# Patient Record
Sex: FEMALE | Race: WHITE | NOT HISPANIC OR LATINO | URBAN - METROPOLITAN AREA
[De-identification: names, ages, dates, MRNs, and addresses within clinical notes are randomized per-mention and may not be internally consistent; named-entity substitution may affect disease eponyms.]

---

## 2023-09-07 ENCOUNTER — LAB (OUTPATIENT)
Dept: LAB | Facility: LAB | Age: 28
End: 2023-09-07
Payer: COMMERCIAL

## 2023-09-07 LAB — TOBACCO SCREEN, URINE: NEGATIVE

## 2023-09-08 DIAGNOSIS — R79.89 ABNORMAL TSH: Primary | ICD-10-CM

## 2023-09-08 LAB
ALANINE AMINOTRANSFERASE (SGPT) (U/L) IN SER/PLAS: 13 U/L (ref 7–45)
ALBUMIN (G/DL) IN SER/PLAS: 4.4 G/DL (ref 3.4–5)
ALKALINE PHOSPHATASE (U/L) IN SER/PLAS: 46 U/L (ref 33–110)
ANION GAP IN SER/PLAS: 11 MMOL/L (ref 10–20)
ASPARTATE AMINOTRANSFERASE (SGOT) (U/L) IN SER/PLAS: 16 U/L (ref 9–39)
BASOPHILS (10*3/UL) IN BLOOD BY AUTOMATED COUNT: 0.06 X10E9/L (ref 0–0.1)
BASOPHILS/100 LEUKOCYTES IN BLOOD BY AUTOMATED COUNT: 1.1 % (ref 0–2)
BILIRUBIN TOTAL (MG/DL) IN SER/PLAS: 0.7 MG/DL (ref 0–1.2)
CALCIUM (MG/DL) IN SER/PLAS: 9.6 MG/DL (ref 8.6–10.3)
CARBON DIOXIDE, TOTAL (MMOL/L) IN SER/PLAS: 28 MMOL/L (ref 21–32)
CHLORIDE (MMOL/L) IN SER/PLAS: 103 MMOL/L (ref 98–107)
CREATININE (MG/DL) IN SER/PLAS: 0.62 MG/DL (ref 0.5–1.05)
EOSINOPHILS (10*3/UL) IN BLOOD BY AUTOMATED COUNT: 0.17 X10E9/L (ref 0–0.7)
EOSINOPHILS/100 LEUKOCYTES IN BLOOD BY AUTOMATED COUNT: 3.2 % (ref 0–6)
ERYTHROCYTE DISTRIBUTION WIDTH (RATIO) BY AUTOMATED COUNT: 12 % (ref 11.5–14.5)
ERYTHROCYTE MEAN CORPUSCULAR HEMOGLOBIN CONCENTRATION (G/DL) BY AUTOMATED: 33.7 G/DL (ref 32–36)
ERYTHROCYTE MEAN CORPUSCULAR VOLUME (FL) BY AUTOMATED COUNT: 94 FL (ref 80–100)
ERYTHROCYTES (10*6/UL) IN BLOOD BY AUTOMATED COUNT: 4.59 X10E12/L (ref 4–5.2)
GFR FEMALE: >90 ML/MIN/1.73M2
GLUCOSE (MG/DL) IN SER/PLAS: 91 MG/DL (ref 74–99)
HEMATOCRIT (%) IN BLOOD BY AUTOMATED COUNT: 43 % (ref 36–46)
HEMOGLOBIN (G/DL) IN BLOOD: 14.5 G/DL (ref 12–16)
IMMATURE GRANULOCYTES/100 LEUKOCYTES IN BLOOD BY AUTOMATED COUNT: 0.2 % (ref 0–0.9)
LEUKOCYTES (10*3/UL) IN BLOOD BY AUTOMATED COUNT: 5.2 X10E9/L (ref 4.4–11.3)
LYMPHOCYTES (10*3/UL) IN BLOOD BY AUTOMATED COUNT: 2.45 X10E9/L (ref 1.2–4.8)
LYMPHOCYTES/100 LEUKOCYTES IN BLOOD BY AUTOMATED COUNT: 46.8 % (ref 13–44)
MONOCYTES (10*3/UL) IN BLOOD BY AUTOMATED COUNT: 0.32 X10E9/L (ref 0.1–1)
MONOCYTES/100 LEUKOCYTES IN BLOOD BY AUTOMATED COUNT: 6.1 % (ref 2–10)
NEUTROPHILS (10*3/UL) IN BLOOD BY AUTOMATED COUNT: 2.23 X10E9/L (ref 1.2–7.7)
NEUTROPHILS/100 LEUKOCYTES IN BLOOD BY AUTOMATED COUNT: 42.6 % (ref 40–80)
NRBC (PER 100 WBCS) BY AUTOMATED COUNT: 0 /100 WBC (ref 0–0)
PLATELETS (10*3/UL) IN BLOOD AUTOMATED COUNT: 277 X10E9/L (ref 150–450)
POTASSIUM (MMOL/L) IN SER/PLAS: 3.8 MMOL/L (ref 3.5–5.3)
PROTEIN TOTAL: 7.3 G/DL (ref 6.4–8.2)
SODIUM (MMOL/L) IN SER/PLAS: 138 MMOL/L (ref 136–145)
THYROTROPIN (MIU/L) IN SER/PLAS BY DETECTION LIMIT <= 0.05 MIU/L: 4.68 MIU/L (ref 0.44–3.98)
THYROXINE (T4) FREE (NG/DL) IN SER/PLAS: 0.83 NG/DL (ref 0.61–1.12)
UREA NITROGEN (MG/DL) IN SER/PLAS: 13 MG/DL (ref 6–23)

## 2023-09-11 ENCOUNTER — LAB (OUTPATIENT)
Dept: LAB | Facility: LAB | Age: 28
End: 2023-09-11
Payer: COMMERCIAL

## 2023-09-11 DIAGNOSIS — R79.89 ABNORMAL TSH: ICD-10-CM

## 2023-09-11 LAB — THYROPEROXIDASE AB (IU/ML) IN SER/PLAS: 35 IU/ML

## 2023-09-11 PROCEDURE — 86800 THYROGLOBULIN ANTIBODY: CPT

## 2023-09-11 PROCEDURE — 86376 MICROSOMAL ANTIBODY EACH: CPT

## 2023-09-11 PROCEDURE — 36415 COLL VENOUS BLD VENIPUNCTURE: CPT

## 2023-09-14 LAB — THYROGLOBULIN AB (IU/ML) IN SER/PLAS: 17 IU/ML (ref 0–4)

## 2023-10-20 ENCOUNTER — APPOINTMENT (OUTPATIENT)
Dept: OBSTETRICS AND GYNECOLOGY | Facility: CLINIC | Age: 28
End: 2023-10-20
Payer: COMMERCIAL

## 2023-10-30 DIAGNOSIS — E03.8 OTHER SPECIFIED HYPOTHYROIDISM: Primary | ICD-10-CM

## 2023-10-31 ENCOUNTER — LAB (OUTPATIENT)
Dept: LAB | Facility: LAB | Age: 28
End: 2023-10-31
Payer: COMMERCIAL

## 2023-10-31 DIAGNOSIS — E03.8 OTHER SPECIFIED HYPOTHYROIDISM: ICD-10-CM

## 2023-10-31 LAB
T4 FREE SERPL-MCNC: 0.71 NG/DL (ref 0.61–1.12)
TSH SERPL-ACNC: 1.62 MIU/L (ref 0.44–3.98)

## 2023-10-31 PROCEDURE — 36415 COLL VENOUS BLD VENIPUNCTURE: CPT

## 2023-10-31 PROCEDURE — 84443 ASSAY THYROID STIM HORMONE: CPT

## 2023-10-31 PROCEDURE — 84439 ASSAY OF FREE THYROXINE: CPT

## 2023-11-06 ENCOUNTER — APPOINTMENT (OUTPATIENT)
Dept: RADIOLOGY | Facility: HOSPITAL | Age: 28
End: 2023-11-06
Payer: COMMERCIAL

## 2023-11-06 ENCOUNTER — TELEPHONE (OUTPATIENT)
Dept: OBSTETRICS AND GYNECOLOGY | Facility: CLINIC | Age: 28
End: 2023-11-06

## 2023-11-06 ENCOUNTER — HOSPITAL ENCOUNTER (EMERGENCY)
Facility: HOSPITAL | Age: 28
Discharge: HOME | End: 2023-11-06
Attending: EMERGENCY MEDICINE
Payer: COMMERCIAL

## 2023-11-06 VITALS
TEMPERATURE: 98.9 F | HEIGHT: 64 IN | BODY MASS INDEX: 22.53 KG/M2 | OXYGEN SATURATION: 99 % | DIASTOLIC BLOOD PRESSURE: 68 MMHG | WEIGHT: 132 LBS | SYSTOLIC BLOOD PRESSURE: 111 MMHG | RESPIRATION RATE: 18 BRPM | HEART RATE: 73 BPM

## 2023-11-06 DIAGNOSIS — O20.0 THREATENED MISCARRIAGE IN EARLY PREGNANCY (HHS-HCC): Primary | ICD-10-CM

## 2023-11-06 LAB
ABO GROUP (TYPE) IN BLOOD: NORMAL
ALBUMIN SERPL BCP-MCNC: 4.3 G/DL (ref 3.4–5)
ALP SERPL-CCNC: 40 U/L (ref 33–110)
ALT SERPL W P-5'-P-CCNC: 12 U/L (ref 7–45)
ANION GAP SERPL CALC-SCNC: 13 MMOL/L (ref 10–20)
ANTIBODY SCREEN: NORMAL
APPEARANCE UR: CLEAR
AST SERPL W P-5'-P-CCNC: 14 U/L (ref 9–39)
B-HCG SERPL-ACNC: 992 MIU/ML
BACTERIA #/AREA URNS AUTO: ABNORMAL /HPF
BASOPHILS # BLD AUTO: 0.05 X10*3/UL (ref 0–0.1)
BASOPHILS NFR BLD AUTO: 0.9 %
BILIRUB DIRECT SERPL-MCNC: 0 MG/DL (ref 0–0.3)
BILIRUB SERPL-MCNC: 0.3 MG/DL (ref 0–1.2)
BILIRUB UR STRIP.AUTO-MCNC: NEGATIVE MG/DL
BUN SERPL-MCNC: 10 MG/DL (ref 6–23)
CALCIUM SERPL-MCNC: 8.9 MG/DL (ref 8.6–10.3)
CHLORIDE SERPL-SCNC: 106 MMOL/L (ref 98–107)
CO2 SERPL-SCNC: 23 MMOL/L (ref 21–32)
COLOR UR: YELLOW
CREAT SERPL-MCNC: 0.57 MG/DL (ref 0.5–1.05)
EOSINOPHIL # BLD AUTO: 0.11 X10*3/UL (ref 0–0.7)
EOSINOPHIL NFR BLD AUTO: 2 %
ERYTHROCYTE [DISTWIDTH] IN BLOOD BY AUTOMATED COUNT: 12 % (ref 11.5–14.5)
GFR SERPL CREATININE-BSD FRML MDRD: >90 ML/MIN/1.73M*2
GLUCOSE SERPL-MCNC: 98 MG/DL (ref 74–99)
GLUCOSE UR STRIP.AUTO-MCNC: NEGATIVE MG/DL
HCG UR QL IA.RAPID: POSITIVE
HCT VFR BLD AUTO: 41.4 % (ref 36–46)
HGB BLD-MCNC: 14.1 G/DL (ref 12–16)
HOLD SPECIMEN: NORMAL
IMM GRANULOCYTES # BLD AUTO: 0.01 X10*3/UL (ref 0–0.7)
IMM GRANULOCYTES NFR BLD AUTO: 0.2 % (ref 0–0.9)
KETONES UR STRIP.AUTO-MCNC: NEGATIVE MG/DL
LEUKOCYTE ESTERASE UR QL STRIP.AUTO: NEGATIVE
LYMPHOCYTES # BLD AUTO: 2.12 X10*3/UL (ref 1.2–4.8)
LYMPHOCYTES NFR BLD AUTO: 38.1 %
MCH RBC QN AUTO: 31.4 PG (ref 26–34)
MCHC RBC AUTO-ENTMCNC: 34.1 G/DL (ref 32–36)
MCV RBC AUTO: 92 FL (ref 80–100)
MONOCYTES # BLD AUTO: 0.35 X10*3/UL (ref 0.1–1)
MONOCYTES NFR BLD AUTO: 6.3 %
MUCOUS THREADS #/AREA URNS AUTO: ABNORMAL /LPF
NEUTROPHILS # BLD AUTO: 2.92 X10*3/UL (ref 1.2–7.7)
NEUTROPHILS NFR BLD AUTO: 52.5 %
NITRITE UR QL STRIP.AUTO: NEGATIVE
NRBC BLD-RTO: 0 /100 WBCS (ref 0–0)
PH UR STRIP.AUTO: 6 [PH]
PLATELET # BLD AUTO: 247 X10*3/UL (ref 150–450)
POTASSIUM SERPL-SCNC: 3.8 MMOL/L (ref 3.5–5.3)
PROT SERPL-MCNC: 7 G/DL (ref 6.4–8.2)
PROT UR STRIP.AUTO-MCNC: NEGATIVE MG/DL
RBC # BLD AUTO: 4.49 X10*6/UL (ref 4–5.2)
RBC # UR STRIP.AUTO: ABNORMAL /UL
RBC #/AREA URNS AUTO: ABNORMAL /HPF
RH FACTOR (ANTIGEN D): NORMAL
SODIUM SERPL-SCNC: 138 MMOL/L (ref 136–145)
SP GR UR STRIP.AUTO: 1.01
UROBILINOGEN UR STRIP.AUTO-MCNC: <2 MG/DL
WBC # BLD AUTO: 5.6 X10*3/UL (ref 4.4–11.3)
WBC #/AREA URNS AUTO: ABNORMAL /HPF

## 2023-11-06 PROCEDURE — 76817 TRANSVAGINAL US OBSTETRIC: CPT

## 2023-11-06 PROCEDURE — 80053 COMPREHEN METABOLIC PANEL: CPT | Performed by: EMERGENCY MEDICINE

## 2023-11-06 PROCEDURE — 85025 COMPLETE CBC W/AUTO DIFF WBC: CPT | Performed by: EMERGENCY MEDICINE

## 2023-11-06 PROCEDURE — 99284 EMERGENCY DEPT VISIT MOD MDM: CPT | Mod: 25

## 2023-11-06 PROCEDURE — 36415 COLL VENOUS BLD VENIPUNCTURE: CPT | Performed by: EMERGENCY MEDICINE

## 2023-11-06 PROCEDURE — 94760 N-INVAS EAR/PLS OXIMETRY 1: CPT

## 2023-11-06 PROCEDURE — 99285 EMERGENCY DEPT VISIT HI MDM: CPT | Mod: 25 | Performed by: EMERGENCY MEDICINE

## 2023-11-06 PROCEDURE — 76815 OB US LIMITED FETUS(S): CPT | Performed by: RADIOLOGY

## 2023-11-06 PROCEDURE — 81025 URINE PREGNANCY TEST: CPT | Performed by: EMERGENCY MEDICINE

## 2023-11-06 PROCEDURE — 81001 URINALYSIS AUTO W/SCOPE: CPT | Performed by: EMERGENCY MEDICINE

## 2023-11-06 PROCEDURE — 84702 CHORIONIC GONADOTROPIN TEST: CPT | Performed by: EMERGENCY MEDICINE

## 2023-11-06 PROCEDURE — 86900 BLOOD TYPING SEROLOGIC ABO: CPT | Performed by: EMERGENCY MEDICINE

## 2023-11-06 PROCEDURE — 82248 BILIRUBIN DIRECT: CPT | Performed by: EMERGENCY MEDICINE

## 2023-11-06 ASSESSMENT — PAIN DESCRIPTION - LOCATION: LOCATION: ABDOMEN

## 2023-11-06 ASSESSMENT — PAIN - FUNCTIONAL ASSESSMENT: PAIN_FUNCTIONAL_ASSESSMENT: 0-10

## 2023-11-06 ASSESSMENT — COLUMBIA-SUICIDE SEVERITY RATING SCALE - C-SSRS
6. HAVE YOU EVER DONE ANYTHING, STARTED TO DO ANYTHING, OR PREPARED TO DO ANYTHING TO END YOUR LIFE?: NO
2. HAVE YOU ACTUALLY HAD ANY THOUGHTS OF KILLING YOURSELF?: NO
1. IN THE PAST MONTH, HAVE YOU WISHED YOU WERE DEAD OR WISHED YOU COULD GO TO SLEEP AND NOT WAKE UP?: NO

## 2023-11-06 ASSESSMENT — PAIN DESCRIPTION - DESCRIPTORS: DESCRIPTORS: TENDER

## 2023-11-06 ASSESSMENT — PAIN SCALES - GENERAL: PAINLEVEL_OUTOF10: 1

## 2023-11-06 ASSESSMENT — PAIN DESCRIPTION - ORIENTATION: ORIENTATION: RIGHT;LOWER

## 2023-11-06 NOTE — TELEPHONE ENCOUNTER
NOB called OB line to schedule her initial OB appointment.  She is currently in the ER at [facility] for spotting for the last 2 days.  She noticed this morning that her breast tenderness is gone and is concerned that she may be having a MAB.  As I was speaking with the patient she was called back for an ultrasound, patient gave verbal permission for me to speak with her  to schedule her NOB.  LMP 9/20/2023, he believes.  She started having pink/red spotting on Saturday, then it turned brown and she did pass a few small clots.  This morning she told him that her breasts were no longer tender so they decided to get her evaluated in the ER to make sure everything with the pregnancy is ok.  I assured patients  that this is the best place to be for this evaluation.  LMP puts patient about 6 weeks 5 days, which is still very early.  They may not be able to see fetal structures on ultrasound due to being too early but its a good place to start.  He did mention that she had labs drawn so far as well. He was advised that its typically recommended to have follow up labs done in about 48 hours, depending on original results.  The providers typically request that patients have their NOB between 8-10 weeks.  For now, NOB scheduled with Dr More on 11/15.  Patients  and/or patient will call office back after they get discharged to see if this appointment needs to be changed and with an update on how she is doing.  Message forwarded to Dr More to make her aware.

## 2023-11-06 NOTE — ED PROVIDER NOTES
HPI   Chief Complaint   Patient presents with    Vaginal Bleeding     Pt arrived to ed from home. Pt states she is 6 weeks pregnant pt states she started having some vaginal bleeding Saturday morning pt states the blood was a brownish color and the spotting lasted all day. Pt states  the bleeding continued and was enough to wear a pad. Pt states  the blood was a brighter red. Pt this morning she is still spotting with lower right abodemen tenderness. Pt describes the pain as being tender. Pt states last menstrual cycle        HPI: []  28-year-old white female  1 para 0 about 6 weeks pregnant has not had any prenatal care so far comes in with vaginal bleeding off and on for last few days.  She stated for the last few days she has had intermittent vaginal bleeding which varies from bright red blood to draw blood.  No abdominal pain or cramping.  No dizziness no weakness no syncope or near syncope she denies hematemesis melena hematochezia.  Patient denies any chest pain pressure heaviness fever chills cough congestion incontinence seizures syncope or near syncope.  Denies urine frequency urgency or hematuria.    Past history: None  Social: Patient denies current tobacco alcohol drug abuse.  REVIEW OF SYSTEMS:    GENERAL.: No weight loss, fatigue, anorexia, insomnia, fever.    EYES: No vision loss, double vision, drainage, eye pain.    ENT: No pharyngitis, dry mouth.    CARDIOPULMONARY: No chest pain, palpitations, syncope, near syncope. No shortness of breath, cough, hemoptysis.    GI: No abdominal pain, change in bowel habits, melena, hematemesis, hematochezia, nausea, vomiting, diarrhea.    : No discharge, dysuria, frequency, urgency, hematuria.  Positive vaginal bleeding    MS: No limb pain, joint pain, joint swelling.    SKIN: No rashes.    PSYCH: No depression, anxiety, suicidality, homicidality.    Review of systems is otherwise negative unless stated above or in history of  present illness.  Social history, family history, allergies reviewed.  PHYSICAL EXAM:    GENERAL: Vitals noted, no distress. Alert and oriented  x 3. Non-toxic.      EENT: TMs clear. Posterior oropharynx unremarkable. No meningismus. No LAD.     NECK: Supple. Nontender. No midline tenderness.     CARDIAC: Regular, rate, rhythm. No murmurs rubs or gallops. No JVD    PULMONARY: Lungs clear bilaterally with good aeration. No wheezes rales or rhonchi. No respiratory distress.     ABDOMEN: Soft, nonsurgical. Nontender. No peritoneal signs. Normoactive bowel sounds. No pulsatile masses.  Negative CVA tenderness    Pelvic: Female chaperone present, normal external genitalia, cervix closed, scant amount of blood in the vaginal vault, no CMT.  No active hemorrhage.    EXTREMITIES: No peripheral edema. Negative Homans bilaterally, no cords.  2+ bounding pulses well-perfused    SKIN: No rash. Intact.     NEURO: No focal neurologic deficits, NIH score of 0. Cranial nerves normal as tested from II through XII.     MEDICAL DECISION MAKING:  CBC with differential is unremarkable no leukocytosis stable hemoglobin pregnancy positive serum hCG is 992 Rh B+, pelvic exam shows a 5-week 0-day gestational sac.  Negative ectopic pregnancy.    Treatment in ED: Arrival IV established, placed on a cardiac monitor.    ED course: Patient remained stable hemodynamically with no active hemorrhage.    Impression: Threatened miscarriage    Plans and MDM: Young for 8 female primigravida comes in with the vaginal bleeding off and on for last few days.  Currently stable hemodynamic.  Stable hemoglobin.  Soft abdomen.  Cervix closed.  No active hemorrhage.  Ultrasound shows 5-week gestational sac, low suspicion for ectopic pregnancy or hemorrhagic shock, patient discharged home with supportive care advised and outpatient follow with primary doctor and OB/GYN for repeat hCG in 48 hours repeat ultrasound in within a week's time with strict return  precautions.                          No data recorded                Patient History   No past medical history on file.  No past surgical history on file.  No family history on file.  Social History     Tobacco Use    Smoking status: Not on file    Smokeless tobacco: Not on file   Substance Use Topics    Alcohol use: Not on file    Drug use: Not on file       Physical Exam   ED Triage Vitals   Temp Heart Rate Resp BP   11/06/23 0610 11/06/23 0610 11/06/23 0610 11/06/23 0610   37.2 °C (98.9 °F) 64 18 109/80      SpO2 Temp Source Heart Rate Source Patient Position   11/06/23 0610 11/06/23 0610 11/06/23 1116 11/06/23 0610   98 % Oral Monitor Sitting      BP Location FiO2 (%)     11/06/23 0610 --     Right arm        Physical Exam    ED Course & MDM   ED Course as of 11/06/23 1130   Mon Nov 06, 2023   1128 Patient remained stable in the ED hemodynamically.  With no active vaginal bleeding.  Pelvic exam performed female chaperone present, normal external genitalia, cervix is closed.  Scant blood in the vaginal vault no active hemorrhage no CMT. [MT]      ED Course User Index  [MT] Edgar Nina MD         Diagnoses as of 11/06/23 1130   Threatened miscarriage in early pregnancy       Medical Decision Making      Procedure  Procedures     Edgar Nina MD  11/06/23 1133

## 2023-11-08 ENCOUNTER — LAB (OUTPATIENT)
Dept: LAB | Facility: LAB | Age: 28
End: 2023-11-08
Payer: COMMERCIAL

## 2023-11-08 ENCOUNTER — PATIENT MESSAGE (OUTPATIENT)
Dept: PRIMARY CARE | Facility: CLINIC | Age: 28
End: 2023-11-08
Payer: COMMERCIAL

## 2023-11-08 DIAGNOSIS — Z34.90 PREGNANCY, UNSPECIFIED GESTATIONAL AGE (HHS-HCC): ICD-10-CM

## 2023-11-08 DIAGNOSIS — Z34.90 PREGNANCY, UNSPECIFIED GESTATIONAL AGE (HHS-HCC): Primary | ICD-10-CM

## 2023-11-08 DIAGNOSIS — N93.9 VAGINAL BLEEDING: ICD-10-CM

## 2023-11-08 PROBLEM — F41.9 ANXIETY: Status: ACTIVE | Noted: 2023-11-08

## 2023-11-08 PROBLEM — M51.26 LUMBAR DISC HERNIATION: Status: ACTIVE | Noted: 2023-11-08

## 2023-11-08 PROBLEM — Z97.5 IUD (INTRAUTERINE DEVICE) IN PLACE: Status: ACTIVE | Noted: 2021-02-17

## 2023-11-08 PROBLEM — M62.830 SPASM OF MUSCLE OF LOWER BACK: Status: ACTIVE | Noted: 2023-11-08

## 2023-11-08 PROBLEM — R53.83 FATIGUE: Status: ACTIVE | Noted: 2023-11-08

## 2023-11-08 PROBLEM — M79.671 PAIN OF RIGHT HEEL: Status: ACTIVE | Noted: 2023-11-08

## 2023-11-08 LAB — B-HCG SERPL-ACNC: 977 MIU/ML

## 2023-11-08 PROCEDURE — 84702 CHORIONIC GONADOTROPIN TEST: CPT

## 2023-11-08 PROCEDURE — 36415 COLL VENOUS BLD VENIPUNCTURE: CPT

## 2023-11-08 RX ORDER — QUINIDINE GLUCONATE 324 MG
TABLET, EXTENDED RELEASE ORAL
COMMUNITY
End: 2023-11-09 | Stop reason: WASHOUT

## 2023-11-08 RX ORDER — TRETINOIN 1 MG/G
1 CREAM TOPICAL NIGHTLY
COMMUNITY
Start: 2023-02-27 | End: 2023-11-09 | Stop reason: WASHOUT

## 2023-11-08 RX ORDER — CYCLOBENZAPRINE HCL 10 MG
10 TABLET ORAL NIGHTLY
COMMUNITY
Start: 2023-02-27 | End: 2023-11-09 | Stop reason: WASHOUT

## 2023-11-08 RX ORDER — SERTRALINE HYDROCHLORIDE 25 MG/1
25 TABLET, FILM COATED ORAL DAILY
COMMUNITY
End: 2023-11-09 | Stop reason: WASHOUT

## 2023-11-08 RX ORDER — DOXYCYCLINE 100 MG/1
1 CAPSULE ORAL
COMMUNITY
End: 2023-11-09 | Stop reason: WASHOUT

## 2023-11-08 RX ORDER — METRONIDAZOLE 7.5 MG/G
1 CREAM TOPICAL EVERY MORNING
COMMUNITY
End: 2023-11-09 | Stop reason: WASHOUT

## 2023-11-08 RX ORDER — GABAPENTIN 300 MG/1
1 CAPSULE ORAL 3 TIMES DAILY
COMMUNITY
Start: 2022-05-25 | End: 2023-11-09 | Stop reason: WASHOUT

## 2023-11-08 RX ORDER — NORGESTIMATE AND ETHINYL ESTRADIOL 0.25-0.035
1 KIT ORAL
COMMUNITY
Start: 2021-02-06 | End: 2023-11-09 | Stop reason: WASHOUT

## 2023-11-08 NOTE — PROGRESS NOTES
Assessment/Plan     28 y.o. female here for -     Early Pregnancy Bleeding  - discussed with patient that while suggestive of abnormal pregnancy, decreasing B-HCG levels alone are not sufficient to diagnose miscarriage  - recommended repeat ultrasound at least one week from first scan; pt already has scheduled (ordered by PCP) for Wednesday 11/15/23  - pending diagnosis of missed AB, discussed management options including expectant management, medication management, and surgical management. Risks and benefits reviewed of each. Pt expresses desire to pursue D&C.  - Follow-up visit scheduled for Wednesday 11/15/23 at 0830 with Dr. More for D&C consult.   - Discussed possibility of experiencing complete, spontaneous AB prior to follow-up visit. Reviewed bleeding precautions. Pt has emergency answering service number and is aware of when to call. Will cancel D&C if products of conception are passed spontaneously.     SYDNEY Pierson-SYDNEE     Subjective     Anne Villa is a 28 y.o.  female presenting for early pregnancy bleeding.  Pt should be 6w4d gestation today by an LMP of 23.  She presented to [facility] ED on 23 for complaints of vaginal bleeding and cramping which started on 23. Chart reviewed:    23 pelvic US visualized an intrauterine gestational sac with a CRL of 5mm corresponding to 5w0d gestation. No yolk sac or fetal pole seen.  23 B-, Blood type B+, H&H 14.1/41.4  23 B-    Pt states she is still experiencing a moderate amount of bleeding, requiring changing a pad every 3 hours. She also has passed some small blood clots. She denies the passage of any large blood clots, fever, chills, dizziness, or feeling faint.    She shares that she is going out of town on 23 and is fearful of anything happening while out of town.    Objective     /62   Wt 60.1 kg (132 lb 9.6 oz)   LMP 2023 (Approximate)   BMI 22.76 kg/m²     Physical  Exam  Vitals reviewed.   Constitutional:       General: She is not in acute distress.     Appearance: Normal appearance.   HENT:      Head: Normocephalic and atraumatic.   Pulmonary:      Effort: Pulmonary effort is normal.   Musculoskeletal:         General: Normal range of motion.      Cervical back: Normal range of motion.   Neurological:      Mental Status: She is alert and oriented to person, place, and time.   Psychiatric:         Mood and Affect: Mood normal.         Behavior: Behavior normal.         Thought Content: Thought content normal.         Judgment: Judgment normal.

## 2023-11-09 ENCOUNTER — INITIAL PRENATAL (OUTPATIENT)
Dept: OBSTETRICS AND GYNECOLOGY | Facility: CLINIC | Age: 28
End: 2023-11-09
Payer: COMMERCIAL

## 2023-11-09 VITALS — SYSTOLIC BLOOD PRESSURE: 104 MMHG | WEIGHT: 132.6 LBS | DIASTOLIC BLOOD PRESSURE: 62 MMHG | BODY MASS INDEX: 22.76 KG/M2

## 2023-11-09 DIAGNOSIS — O26.91 ABNORMAL PREGNANCY IN FIRST TRIMESTER (HHS-HCC): ICD-10-CM

## 2023-11-09 DIAGNOSIS — O20.9 BLEEDING IN EARLY PREGNANCY (HHS-HCC): Primary | ICD-10-CM

## 2023-11-09 PROCEDURE — 0500F INITIAL PRENATAL CARE VISIT: CPT

## 2023-11-14 ENCOUNTER — APPOINTMENT (OUTPATIENT)
Dept: OBSTETRICS AND GYNECOLOGY | Facility: CLINIC | Age: 28
End: 2023-11-14
Payer: COMMERCIAL

## 2023-11-15 ENCOUNTER — APPOINTMENT (OUTPATIENT)
Dept: RADIOLOGY | Facility: CLINIC | Age: 28
End: 2023-11-15
Payer: COMMERCIAL

## 2023-11-15 ENCOUNTER — ANCILLARY PROCEDURE (OUTPATIENT)
Dept: RADIOLOGY | Facility: CLINIC | Age: 28
End: 2023-11-15
Payer: COMMERCIAL

## 2023-11-15 ENCOUNTER — APPOINTMENT (OUTPATIENT)
Dept: OBSTETRICS AND GYNECOLOGY | Facility: CLINIC | Age: 28
End: 2023-11-15
Payer: COMMERCIAL

## 2023-11-15 DIAGNOSIS — N93.9 VAGINAL BLEEDING: ICD-10-CM

## 2023-11-15 DIAGNOSIS — Z34.90 PREGNANCY, UNSPECIFIED GESTATIONAL AGE (HHS-HCC): ICD-10-CM

## 2023-11-15 PROCEDURE — 76856 US EXAM PELVIC COMPLETE: CPT | Performed by: RADIOLOGY

## 2023-11-15 PROCEDURE — 76830 TRANSVAGINAL US NON-OB: CPT | Performed by: RADIOLOGY

## 2023-11-15 PROCEDURE — 76830 TRANSVAGINAL US NON-OB: CPT

## 2023-11-17 ENCOUNTER — TELEPHONE (OUTPATIENT)
Dept: OBSTETRICS AND GYNECOLOGY | Facility: CLINIC | Age: 28
End: 2023-11-17

## 2023-11-17 NOTE — TELEPHONE ENCOUNTER
Called patient, verified name and .  Reviewed ultrasound report from 11/15/23. Absence of previously seen gestational sac is consistent with completed, spontaneous miscarriage. Pt states that her bleeding has ceased and physically she is feeling well. All questions answered. Condolences expressed. She plans to start counseling to help her with grieving. She and her  do plan on trying for another pregnancy - encouraged patient to wait until after next menses to begin trying. She verbalizes understanding.    ADONAY Corrigan CNM

## 2023-11-17 NOTE — TELEPHONE ENCOUNTER
Pt called in wanting to know if Shekhar could look over her ultrasound results and give her a call.

## 2023-12-04 ENCOUNTER — APPOINTMENT (OUTPATIENT)
Dept: OBSTETRICS AND GYNECOLOGY | Facility: CLINIC | Age: 28
End: 2023-12-04
Payer: COMMERCIAL

## 2023-12-11 ENCOUNTER — PATIENT MESSAGE (OUTPATIENT)
Dept: PRIMARY CARE | Facility: CLINIC | Age: 28
End: 2023-12-11

## 2023-12-11 ENCOUNTER — APPOINTMENT (OUTPATIENT)
Dept: OBSTETRICS AND GYNECOLOGY | Facility: CLINIC | Age: 28
End: 2023-12-11
Payer: COMMERCIAL

## 2023-12-11 DIAGNOSIS — E03.8 OTHER SPECIFIED HYPOTHYROIDISM: ICD-10-CM

## 2023-12-11 DIAGNOSIS — E06.3 HASHIMOTO'S THYROIDITIS: Primary | ICD-10-CM

## 2023-12-11 RX ORDER — LEVOTHYROXINE SODIUM 25 UG/1
25 TABLET ORAL DAILY
Qty: 90 TABLET | Refills: 3 | Status: SHIPPED | OUTPATIENT
Start: 2023-12-11 | End: 2024-12-10

## 2023-12-21 ENCOUNTER — TELEPHONE (OUTPATIENT)
Dept: OBSTETRICS AND GYNECOLOGY | Facility: CLINIC | Age: 28
End: 2023-12-21
Payer: COMMERCIAL

## 2023-12-22 ENCOUNTER — TELEPHONE (OUTPATIENT)
Dept: OBSTETRICS AND GYNECOLOGY | Facility: CLINIC | Age: 28
End: 2023-12-22

## 2023-12-22 DIAGNOSIS — Z32.00 POSSIBLE PREGNANCY: Primary | ICD-10-CM

## 2023-12-22 NOTE — TELEPHONE ENCOUNTER
EP JOSE RAFAEL left message on ob line stating she just took a + UPT but is unsure how far along she is due to recently having a miscarriage at the beginning of November and not having a period since.  Patient is requesting BHCG's or ultrasound.  Please advise

## 2023-12-23 ENCOUNTER — LAB (OUTPATIENT)
Dept: LAB | Facility: LAB | Age: 28
End: 2023-12-23

## 2023-12-23 DIAGNOSIS — O36.80X0 PREGNANCY OF UNKNOWN ANATOMIC LOCATION (HHS-HCC): Primary | ICD-10-CM

## 2023-12-23 DIAGNOSIS — E06.3 HASHIMOTO'S THYROIDITIS: ICD-10-CM

## 2023-12-23 DIAGNOSIS — Z32.00 POSSIBLE PREGNANCY: ICD-10-CM

## 2023-12-23 LAB
B-HCG SERPL-ACNC: 8499 MIU/ML
TSH SERPL-ACNC: 1.42 MIU/L (ref 0.44–3.98)

## 2023-12-23 PROCEDURE — 36415 COLL VENOUS BLD VENIPUNCTURE: CPT

## 2023-12-23 PROCEDURE — 84443 ASSAY THYROID STIM HORMONE: CPT

## 2023-12-23 PROCEDURE — 84702 CHORIONIC GONADOTROPIN TEST: CPT

## 2023-12-26 ENCOUNTER — LAB (OUTPATIENT)
Dept: LAB | Facility: LAB | Age: 28
End: 2023-12-26
Payer: COMMERCIAL

## 2023-12-26 DIAGNOSIS — Z32.00 POSSIBLE PREGNANCY: ICD-10-CM

## 2023-12-26 LAB — B-HCG SERPL-ACNC: ABNORMAL MIU/ML

## 2023-12-26 PROCEDURE — 84702 CHORIONIC GONADOTROPIN TEST: CPT

## 2023-12-26 PROCEDURE — 36415 COLL VENOUS BLD VENIPUNCTURE: CPT

## 2023-12-29 ENCOUNTER — LAB (OUTPATIENT)
Dept: LAB | Facility: LAB | Age: 28
End: 2023-12-29
Payer: COMMERCIAL

## 2023-12-29 DIAGNOSIS — Z32.00 POSSIBLE PREGNANCY: ICD-10-CM

## 2023-12-29 LAB — B-HCG SERPL-ACNC: ABNORMAL MIU/ML

## 2023-12-29 PROCEDURE — 84702 CHORIONIC GONADOTROPIN TEST: CPT

## 2023-12-29 PROCEDURE — 36415 COLL VENOUS BLD VENIPUNCTURE: CPT

## 2024-01-02 ENCOUNTER — TELEPHONE (OUTPATIENT)
Dept: OBSTETRICS AND GYNECOLOGY | Facility: CLINIC | Age: 29
End: 2024-01-02
Payer: COMMERCIAL

## 2024-01-02 NOTE — TELEPHONE ENCOUNTER
Called patient, verified name and .     Pt states her right-sided abdominal pain has improved since she called on 23. It is now much more intermittent and mild. She notices improvement with Tylenol. It does not prevent her from performing ADL's, working, etc.   She currently has an ultrasound appointment scheduled for 24. Offered to move this appointment up earlier, however she declines at this time due to her work schedule. Reviewed s/s of ectopic pregnancy which would warrant ED evaluation. Pt verbalizes understanding.     ARELIS Pierson

## 2024-01-08 ENCOUNTER — APPOINTMENT (OUTPATIENT)
Dept: OBSTETRICS AND GYNECOLOGY | Facility: CLINIC | Age: 29
End: 2024-01-08
Payer: COMMERCIAL

## 2024-01-08 ENCOUNTER — HOSPITAL ENCOUNTER (OUTPATIENT)
Dept: RADIOLOGY | Facility: HOSPITAL | Age: 29
Discharge: HOME | End: 2024-01-08
Payer: COMMERCIAL

## 2024-01-08 DIAGNOSIS — O36.80X0 PREGNANCY OF UNKNOWN ANATOMIC LOCATION (HHS-HCC): ICD-10-CM

## 2024-01-08 PROCEDURE — 76801 OB US < 14 WKS SINGLE FETUS: CPT | Performed by: OBSTETRICS & GYNECOLOGY

## 2024-01-08 PROCEDURE — 76801 OB US < 14 WKS SINGLE FETUS: CPT

## 2024-01-08 PROCEDURE — 76817 TRANSVAGINAL US OBSTETRIC: CPT | Performed by: OBSTETRICS & GYNECOLOGY

## 2024-01-10 ENCOUNTER — APPOINTMENT (OUTPATIENT)
Dept: OBSTETRICS AND GYNECOLOGY | Facility: CLINIC | Age: 29
End: 2024-01-10
Payer: COMMERCIAL

## 2024-01-17 ENCOUNTER — INITIAL PRENATAL (OUTPATIENT)
Dept: OBSTETRICS AND GYNECOLOGY | Facility: CLINIC | Age: 29
End: 2024-01-17
Payer: COMMERCIAL

## 2024-01-17 VITALS — SYSTOLIC BLOOD PRESSURE: 120 MMHG | WEIGHT: 139 LBS | DIASTOLIC BLOOD PRESSURE: 58 MMHG | BODY MASS INDEX: 23.86 KG/M2

## 2024-01-17 DIAGNOSIS — O21.9 NAUSEA AND VOMITING IN PREGNANCY (HHS-HCC): ICD-10-CM

## 2024-01-17 DIAGNOSIS — Z3A.09 9 WEEKS GESTATION OF PREGNANCY (HHS-HCC): Primary | ICD-10-CM

## 2024-01-17 DIAGNOSIS — E06.3 HASHIMOTO'S DISEASE: ICD-10-CM

## 2024-01-17 PROBLEM — Z97.5 IUD (INTRAUTERINE DEVICE) IN PLACE: Status: RESOLVED | Noted: 2021-02-17 | Resolved: 2024-01-17

## 2024-01-17 LAB — PREGNANCY TEST URINE, POC: POSITIVE

## 2024-01-17 PROCEDURE — 0500F INITIAL PRENATAL CARE VISIT: CPT

## 2024-01-17 PROCEDURE — 87086 URINE CULTURE/COLONY COUNT: CPT

## 2024-01-17 PROCEDURE — 81025 URINE PREGNANCY TEST: CPT

## 2024-01-17 PROCEDURE — 87800 DETECT AGNT MULT DNA DIREC: CPT

## 2024-01-17 ASSESSMENT — EDINBURGH POSTNATAL DEPRESSION SCALE (EPDS)
THINGS HAVE BEEN GETTING ON TOP OF ME: NO, MOST OF THE TIME I HAVE COPED QUITE WELL
I HAVE BEEN ABLE TO LAUGH AND SEE THE FUNNY SIDE OF THINGS: AS MUCH AS I ALWAYS COULD
I HAVE BLAMED MYSELF UNNECESSARILY WHEN THINGS WENT WRONG: NO, NEVER
TOTAL SCORE: 1
I HAVE BEEN ANXIOUS OR WORRIED FOR NO GOOD REASON: NO, NOT AT ALL
I HAVE FELT SAD OR MISERABLE: NO, NOT AT ALL
I HAVE BEEN SO UNHAPPY THAT I HAVE HAD DIFFICULTY SLEEPING: NOT AT ALL
THE THOUGHT OF HARMING MYSELF HAS OCCURRED TO ME: NEVER
I HAVE BEEN SO UNHAPPY THAT I HAVE BEEN CRYING: NO, NEVER
I HAVE LOOKED FORWARD WITH ENJOYMENT TO THINGS: AS MUCH AS I EVER DID
I HAVE FELT SCARED OR PANICKY FOR NO GOOD REASON: NO, NOT AT ALL

## 2024-01-17 NOTE — PROGRESS NOTES
Subjective   Anne Villa is a 28 y.o.  at Unknown with a working estimated date of delivery of Not found. who presents for an initial prenatal visit. This pregnancy is unplanned.  PAP: 2021, Normal and HPV neg([facility])  Occupation: Physical therapist  Partner: Gomez, resident  Feels safe at home: yes  Previous  section: none  Patient currently experiencing: nausea and breast tenderness. Discussed with patient ways to alleviate n/v. Discussed use of peppermint and may candies or tea, eating small frequent carb heavy meals, avoiding spicy and high fat foods, and eating carbohydrates about 30 minutes before rising for the day. Patient educated on acupressure for relief of nausea with sea bands. Patient instructed to begin taking a vitamin b6 25 mg tid with the addition of  25 mg Unisom at HS. Patient instructed that if she continues to suffer from daytime nausea and vomiting she may take an additional dose of 12.5 mg of Unisom. Patient encouraged to call office or emergency line if symptoms do not improve or worsen.    Taking prenatal vitamin: Yes  Dating: completed  and 8.0 weeks at that time    Pregravid BMI: 23  Normal weight, BMI 18.5-<24.9, discussed recommended weight gain of 25-35#    Preeclampsia risk:  ASA prophylaxis: Does not meet requirement for prophylaxis  Ob HX: 6 SAB  PMH: hashimotos  Hx of depression/anxiety: yes, was on sertraline though stopped prior to getting pregnant. Patient reports good mood at this time.   PSHX: none  SH:  patient denies use of drugs, alcohol, or tobacco.   Pap HX:  2021, nml    Covid vaccine: vaccinated and declines most recent booster  Flu vaccine: had    OB History    Para Term  AB Living   2             SAB IAB Ectopic Multiple Live Births                  # Outcome Date GA Lbr Josse/2nd Weight Sex Delivery Anes PTL Lv   2 Current            1               Medical Problems       Problem List       Anemia, unspecified     Overview Signed 11/8/2023 11:00 PM by Anjelica Cooper     Formatting of this note might be different from the original. This term is a replacement for an inactive term.         Anxiety    Cough variant asthma    Fatigue    IUD (intrauterine device) in place    Overview Signed 11/8/2023 11:00 PM by Anjelica Cooper     Formatting of this note might be different from the original. Kyleena IUD inserted 2/17/21         Lumbar disc herniation    Other acne    Pain in joint, shoulder region    Pain of right heel    Shortness of breath    Spasm of muscle of lower back          Objective      TWG: Not found.   Expected Total Weight Gain: Could not be calculated   Pregravid BMI: Could not be calculated     Pregnancy Problems (from 01/17/24 to present)       No problems associated with this episode.             Assessment /Plan     Genetic testing: The patient was counseled regarding prenatal genetic testing options and was provided literature in the obstetric folder. First line screening options, including cfDNA and first trimester ultrasound for nuchal translucency, were discussed and offered.  Benefits, drawbacks, and limitations were explained respectively.  It was clearly stated that only diagnostic testing via CVS or amniocentesis provides definitive information regarding a genetic diagnosis in the fetus (risk of complications with CVS 1/200, risk of fetal loss with CVS 1/400; risk of complications with amniocentesis 1/400 and a risk of fetal loss with amniocentesis 1/1000). It was discussed with the patient that the false positive rates for NIPT is higher for women under 35 years of age. It was encouraged that patient's with high risk personal/family history be referred to genetics for additional screening and counselling. This patient has decided to pursue NIPS and NT scan    Education provided r/t nutrition, prenatal vitamins, folic acid supplementation, dietary guidelines, exercise, smoking, alcohol,  caffeine and drug use. Healthy weight gain in pregnancy discussed.     Physical activity -patient encouraged to be physically active throughout pregnancy to promote healthy weight gain.     Safety- Discussed with patient that she may use Tylenol as a pain reliever, but to avoid the use of NSAIDS. Patient informed of safe seat belt use, avoidance of hot tubs and saunas, and when to stop air travel.     Dental health- patient encouraged to maintain good dental health through pregnancy with routine dental screening.     Infant feeding- discussed patient's infant feeding preferences and strongly encouraged breastfeeding     Patient is appropriate for midwifery care. Patient oriented to practice, collaborative practice model, and educated on visit frequency    Warning s/s discussed and SAB precautions reviewed.   RTC 4wks/prn -    NOB plan:   Hashimoto's patient to have TSH completed, endocrinology referral placed  All new OB labs, NIPS, and PE and PAP at next visit

## 2024-01-18 LAB
BACTERIA UR CULT: NO GROWTH
C TRACH RRNA SPEC QL NAA+PROBE: NEGATIVE
N GONORRHOEA DNA SPEC QL PROBE+SIG AMP: NEGATIVE

## 2024-01-23 ENCOUNTER — LAB (OUTPATIENT)
Dept: LAB | Facility: LAB | Age: 29
End: 2024-01-23
Payer: COMMERCIAL

## 2024-01-23 DIAGNOSIS — E06.3 HASHIMOTO'S DISEASE: ICD-10-CM

## 2024-01-23 LAB — TSH SERPL-ACNC: 1.43 MIU/L (ref 0.44–3.98)

## 2024-01-23 PROCEDURE — 84443 ASSAY THYROID STIM HORMONE: CPT

## 2024-01-23 PROCEDURE — 36415 COLL VENOUS BLD VENIPUNCTURE: CPT

## 2024-02-12 ENCOUNTER — APPOINTMENT (OUTPATIENT)
Dept: RADIOLOGY | Facility: CLINIC | Age: 29
End: 2024-02-12
Payer: COMMERCIAL

## 2024-02-12 ENCOUNTER — HOSPITAL ENCOUNTER (OUTPATIENT)
Dept: RADIOLOGY | Facility: CLINIC | Age: 29
Discharge: HOME | End: 2024-02-12
Payer: COMMERCIAL

## 2024-02-12 DIAGNOSIS — N93.9 ABNORMAL UTERINE AND VAGINAL BLEEDING, UNSPECIFIED: ICD-10-CM

## 2024-02-12 DIAGNOSIS — Z34.90 ENCOUNTER FOR SUPERVISION OF NORMAL PREGNANCY, UNSPECIFIED, UNSPECIFIED TRIMESTER (HHS-HCC): ICD-10-CM

## 2024-02-12 DIAGNOSIS — Z3A.13 13 WEEKS GESTATION OF PREGNANCY (HHS-HCC): Primary | ICD-10-CM

## 2024-02-12 PROCEDURE — 76813 OB US NUCHAL MEAS 1 GEST: CPT | Performed by: MEDICAL GENETICS

## 2024-02-12 PROCEDURE — 76813 OB US NUCHAL MEAS 1 GEST: CPT

## 2024-02-14 ENCOUNTER — LAB (OUTPATIENT)
Dept: LAB | Facility: LAB | Age: 29
End: 2024-02-14
Payer: COMMERCIAL

## 2024-02-14 ENCOUNTER — TELEPHONE (OUTPATIENT)
Dept: OBSTETRICS AND GYNECOLOGY | Facility: CLINIC | Age: 29
End: 2024-02-14

## 2024-02-14 ENCOUNTER — ROUTINE PRENATAL (OUTPATIENT)
Dept: OBSTETRICS AND GYNECOLOGY | Facility: CLINIC | Age: 29
End: 2024-02-14
Payer: COMMERCIAL

## 2024-02-14 VITALS — SYSTOLIC BLOOD PRESSURE: 112 MMHG | WEIGHT: 140.5 LBS | DIASTOLIC BLOOD PRESSURE: 60 MMHG | BODY MASS INDEX: 24.12 KG/M2

## 2024-02-14 DIAGNOSIS — Z3A.13 13 WEEKS GESTATION OF PREGNANCY (HHS-HCC): ICD-10-CM

## 2024-02-14 DIAGNOSIS — O09.31 INSUFFICIENT PRENATAL CARE IN FIRST TRIMESTER (HHS-HCC): ICD-10-CM

## 2024-02-14 DIAGNOSIS — N63.11 MASS OF UPPER OUTER QUADRANT OF RIGHT BREAST: ICD-10-CM

## 2024-02-14 DIAGNOSIS — Z3A.13 13 WEEKS GESTATION OF PREGNANCY (HHS-HCC): Primary | ICD-10-CM

## 2024-02-14 PROBLEM — Z34.01 ENCOUNTER FOR SUPERVISION OF NORMAL FIRST PREGNANCY IN FIRST TRIMESTER (HHS-HCC): Status: ACTIVE | Noted: 2024-02-14

## 2024-02-14 LAB
ABO GROUP (TYPE) IN BLOOD: NORMAL
ANTIBODY SCREEN: NORMAL
ERYTHROCYTE [DISTWIDTH] IN BLOOD BY AUTOMATED COUNT: 13.1 % (ref 11.5–14.5)
HBV SURFACE AG SERPL QL IA: NONREACTIVE
HCT VFR BLD AUTO: 42 % (ref 36–46)
HCV AB SER QL: NONREACTIVE
HGB BLD-MCNC: 14.2 G/DL (ref 12–16)
MCH RBC QN AUTO: 31.8 PG (ref 26–34)
MCHC RBC AUTO-ENTMCNC: 33.8 G/DL (ref 32–36)
MCV RBC AUTO: 94 FL (ref 80–100)
NRBC BLD-RTO: 0 /100 WBCS (ref 0–0)
PLATELET # BLD AUTO: 249 X10*3/UL (ref 150–450)
RBC # BLD AUTO: 4.47 X10*6/UL (ref 4–5.2)
REFLEX ADDED, ANEMIA PANEL: NORMAL
RH FACTOR (ANTIGEN D): NORMAL
WBC # BLD AUTO: 10.4 X10*3/UL (ref 4.4–11.3)

## 2024-02-14 PROCEDURE — 87389 HIV-1 AG W/HIV-1&-2 AB AG IA: CPT

## 2024-02-14 PROCEDURE — 85027 COMPLETE CBC AUTOMATED: CPT

## 2024-02-14 PROCEDURE — 86780 TREPONEMA PALLIDUM: CPT

## 2024-02-14 PROCEDURE — 86803 HEPATITIS C AB TEST: CPT

## 2024-02-14 PROCEDURE — 86901 BLOOD TYPING SEROLOGIC RH(D): CPT

## 2024-02-14 PROCEDURE — 86900 BLOOD TYPING SEROLOGIC ABO: CPT

## 2024-02-14 PROCEDURE — 86317 IMMUNOASSAY INFECTIOUS AGENT: CPT

## 2024-02-14 PROCEDURE — 0501F PRENATAL FLOW SHEET: CPT

## 2024-02-14 PROCEDURE — 36415 COLL VENOUS BLD VENIPUNCTURE: CPT

## 2024-02-14 PROCEDURE — 87340 HEPATITIS B SURFACE AG IA: CPT

## 2024-02-14 PROCEDURE — 86850 RBC ANTIBODY SCREEN: CPT

## 2024-02-14 NOTE — PROGRESS NOTES
Subjective   Anne Villa is a 29 y.o.  at 13w2d with a working estimated date of delivery of 2024, by Ultrasound who presents for a routine prenatal visit. She denies vaginal bleeding.    Patient reports that she is feeling much better.     Her pregnancy is complicated by:  Hashimotos  Depression, un-medicated      Objective   Physical Exam  Weight: 63.7 kg (140 lb 8 oz), Pregravid BMI: 22.30  TW.763 kg (10 lb 8 oz)   Expected Total Weight Gain: 11.5 kg (25 lb)-16 kg (35 lb)   BP: 112/60           Lab Results   Component Value Date    HGB 14.1 2023    HCT 41.4 2023     2023    ABO B 2023    LABRH POS 2023    NEISSGONOAMP Negative 2024    CHLAMTRACAMP Negative 2024    URINECULTURE No growth 2024     NIPT: ordered today      Assessment/Plan   PE today, will defer pap until postpartum per patient request  Re-evaluate right breast lump   Reviewed warning signs and when to call provider  Follow up in 4 weeks for a routine prenatal visit.    ARELIS Gautam

## 2024-02-15 LAB
HIV 1+2 AB+HIV1 P24 AG SERPL QL IA: NONREACTIVE
RUBV IGG SERPL IA-ACNC: 2.5 IA
RUBV IGG SERPL QL IA: POSITIVE
TREPONEMA PALLIDUM IGG+IGM AB [PRESENCE] IN SERUM OR PLASMA BY IMMUNOASSAY: NONREACTIVE

## 2024-02-16 ENCOUNTER — APPOINTMENT (OUTPATIENT)
Dept: OPHTHALMOLOGY | Facility: CLINIC | Age: 29
End: 2024-02-16
Payer: COMMERCIAL

## 2024-02-19 ENCOUNTER — APPOINTMENT (OUTPATIENT)
Dept: PRIMARY CARE | Facility: CLINIC | Age: 29
End: 2024-02-19
Payer: COMMERCIAL

## 2024-02-19 NOTE — TELEPHONE ENCOUNTER
I spoke with patient and everything is good. She had no questions or concerns.    Monet Villela M.A.

## 2024-02-27 LAB — SCAN RESULT: NORMAL

## 2024-03-13 ENCOUNTER — ROUTINE PRENATAL (OUTPATIENT)
Dept: OBSTETRICS AND GYNECOLOGY | Facility: CLINIC | Age: 29
End: 2024-03-13
Payer: COMMERCIAL

## 2024-03-13 VITALS — SYSTOLIC BLOOD PRESSURE: 110 MMHG | BODY MASS INDEX: 24.29 KG/M2 | WEIGHT: 141.5 LBS | DIASTOLIC BLOOD PRESSURE: 60 MMHG

## 2024-03-13 DIAGNOSIS — Z3A.17 17 WEEKS GESTATION OF PREGNANCY (HHS-HCC): Primary | ICD-10-CM

## 2024-03-13 DIAGNOSIS — E06.3 HASHIMOTO'S DISEASE: ICD-10-CM

## 2024-03-13 DIAGNOSIS — O99.340 DEPRESSION AFFECTING PREGNANCY (HHS-HCC): ICD-10-CM

## 2024-03-13 DIAGNOSIS — Z34.01 ENCOUNTER FOR SUPERVISION OF NORMAL FIRST PREGNANCY IN FIRST TRIMESTER (HHS-HCC): ICD-10-CM

## 2024-03-13 DIAGNOSIS — N63.10 MASS OF RIGHT BREAST, UNSPECIFIED QUADRANT: ICD-10-CM

## 2024-03-13 DIAGNOSIS — F32.A DEPRESSION AFFECTING PREGNANCY (HHS-HCC): ICD-10-CM

## 2024-03-13 PROCEDURE — 0501F PRENATAL FLOW SHEET: CPT

## 2024-03-13 RX ORDER — SERTRALINE HYDROCHLORIDE 25 MG/1
25 TABLET, FILM COATED ORAL DAILY
Qty: 90 TABLET | Refills: 0 | Status: SHIPPED | OUTPATIENT
Start: 2024-03-13 | End: 2024-04-11 | Stop reason: ALTCHOICE

## 2024-03-13 ASSESSMENT — EDINBURGH POSTNATAL DEPRESSION SCALE (EPDS)
I HAVE BEEN ABLE TO LAUGH AND SEE THE FUNNY SIDE OF THINGS: NOT QUITE SO MUCH NOW
I HAVE BLAMED MYSELF UNNECESSARILY WHEN THINGS WENT WRONG: NOT VERY OFTEN
THINGS HAVE BEEN GETTING ON TOP OF ME: YES, SOMETIMES I HAVEN'T BEEN COPING AS WELL AS USUAL
I HAVE FELT SCARED OR PANICKY FOR NO GOOD REASON: NO, NOT MUCH
I HAVE BEEN SO UNHAPPY THAT I HAVE BEEN CRYING: ONLY OCCASIONALLY
I HAVE LOOKED FORWARD WITH ENJOYMENT TO THINGS: RATHER LESS THAN I USED TO
I HAVE BEEN ANXIOUS OR WORRIED FOR NO GOOD REASON: YES, VERY OFTEN
THE THOUGHT OF HARMING MYSELF HAS OCCURRED TO ME: NEVER
TOTAL SCORE: 14
I HAVE FELT SAD OR MISERABLE: YES, QUITE OFTEN
I HAVE BEEN SO UNHAPPY THAT I HAVE HAD DIFFICULTY SLEEPING: YES, SOMETIMES

## 2024-03-13 NOTE — PROGRESS NOTES
Patient presents today for OBFU.  Patient would like to discuss anti-depressant medication today.    KISHOR VALENTINO MA II    Subjective     Anne Villa is a 29 y.o.  at 17w2d with a working estimated date of delivery of 2024, by Ultrasound who presents for a routine prenatal visit. She denies vaginal bleeding, leakage of fluid, or contractions. Patient reports not yet feeling fetal movement.     Patient has a hx of depression and reports worsening of symptoms over the last 3-4 weeks. Patient reports that she has felt down, unmotivated,  and is not getting much enjoyment out of her normal daily activities. Patient is under care of a counselor at [facility]. EPDS today 14. Patient has been on sertraline in the past and would like to start again today. Discussed risk of  Poor Adjustment Syndrome (with symptoms including but not limited to irritability, feeding difficulties, increased respiratory rate).  Reviewed that severe symptoms are rare and overall symptoms typically are self-limited and resolve within a week.  Reviewed that there is no data to suggest that discontinuing these medications in the third trimester decreases risk to the . Discussed risk of worsening maternal symptoms at the end of pregnancy or postpartum if medications are discontinued in the third trimester.      Physical Exam  Constitutional:       Appearance: Normal appearance.   Eyes:      Conjunctiva/sclera: Conjunctivae normal.   Pulmonary:      Effort: Pulmonary effort is normal.   Chest:          Comments: Pea sized. Non adherent, soft, mobile, right breast lump 2 cm from nipple line on areola line at 10 O'clock position.  Neurological:      Mental Status: She is alert.   Psychiatric:         Mood and Affect: Mood normal.              Current Outpatient Medications on File Prior to Visit   Medication Sig Dispense Refill    levothyroxine (Synthroid, Levoxyl) 25 mcg tablet Take 1 tablet (25 mcg)  by mouth once daily. 90 tablet 3     No current facility-administered medications on file prior to visit.        Her pregnancy is complicated by:  Medical Problems       Problem List       Anemia, unspecified    Overview Signed 2023 11:00 PM by Anjelica Cooper     Formatting of this note might be different from the original. This term is a replacement for an inactive term.         Anxiety    Fatigue    Lumbar disc herniation    Other acne    Pain in joint, shoulder region    Pain of right heel    Shortness of breath    Spasm of muscle of lower back    Encounter for supervision of normal first pregnancy in first trimester    Overview Signed 2024 10:07 AM by ARELIS Gautam     Desires rr NIPS  Hashimotos, endo referral placed, TSH 1.43  Had Covid vaccine, declines booster  Depression, unmedicated- stopped sertraline with pregnancy                Objective   Weight: 64.2 kg (141 lb 8 oz)  Expected Total Weight Gain: 11.5 kg (25 lb)-16 kg (35 lb)   TW.216 kg (11 lb 8 oz)  Pregravid BMI: 22.30      BP: 110/60          Prenatal Labs:  Lab Results   Component Value Date    HGB 14.2 2024    HCT 42.0 2024     2024    ABO B 2024    LABRH POS 2024    NEISSGONOAMP Negative 2024    CHLAMTRACAMP Negative 2024    SYPHT Nonreactive 2024    HEPBSAG Nonreactive 2024    HIV1X2 Nonreactive 2024    URINECULTURE No growth 2024       Assessment/Plan   Patient to begin sertraline 25 mg daily. Patient to establish care with ADONAY Haynes, referral placed.   Repeat EPDS in roughly 6-8 weeks to assess for effectiveness of medication management  Breast US placed for persistent right breast lump  TSH today for hashimoto's, patient has not yet established with endocrine. Will task OB navigator to help patient in scheduling  Review anatomy scan  Follow up in 4 weeks for a routine prenatal visit.    ARELIS Gautam

## 2024-03-19 PROBLEM — E06.3 HASHIMOTO'S THYROIDITIS: Status: ACTIVE | Noted: 2024-03-19

## 2024-03-25 ENCOUNTER — APPOINTMENT (OUTPATIENT)
Dept: RADIOLOGY | Facility: CLINIC | Age: 29
End: 2024-03-25
Payer: COMMERCIAL

## 2024-03-27 ENCOUNTER — HOSPITAL ENCOUNTER (OUTPATIENT)
Dept: RADIOLOGY | Facility: CLINIC | Age: 29
Discharge: HOME | End: 2024-03-27
Payer: COMMERCIAL

## 2024-03-27 DIAGNOSIS — Z3A.13 13 WEEKS GESTATION OF PREGNANCY (HHS-HCC): ICD-10-CM

## 2024-03-27 PROCEDURE — 76805 OB US >/= 14 WKS SNGL FETUS: CPT

## 2024-03-27 PROCEDURE — 76805 OB US >/= 14 WKS SNGL FETUS: CPT | Performed by: OBSTETRICS & GYNECOLOGY

## 2024-04-10 ENCOUNTER — APPOINTMENT (OUTPATIENT)
Dept: OBSTETRICS AND GYNECOLOGY | Facility: CLINIC | Age: 29
End: 2024-04-10
Payer: COMMERCIAL

## 2024-04-11 ENCOUNTER — OFFICE VISIT (OUTPATIENT)
Dept: ENDOCRINOLOGY | Facility: CLINIC | Age: 29
End: 2024-04-11
Payer: COMMERCIAL

## 2024-04-11 ENCOUNTER — LAB (OUTPATIENT)
Dept: LAB | Facility: LAB | Age: 29
End: 2024-04-11
Payer: COMMERCIAL

## 2024-04-11 VITALS
DIASTOLIC BLOOD PRESSURE: 50 MMHG | SYSTOLIC BLOOD PRESSURE: 94 MMHG | BODY MASS INDEX: 24.75 KG/M2 | HEIGHT: 64 IN | WEIGHT: 145 LBS

## 2024-04-11 DIAGNOSIS — E06.3 HASHIMOTO'S DISEASE: ICD-10-CM

## 2024-04-11 DIAGNOSIS — O99.282 THYROID DISEASE DURING PREGNANCY IN SECOND TRIMESTER (HHS-HCC): ICD-10-CM

## 2024-04-11 DIAGNOSIS — E03.9 HYPOTHYROIDISM, UNSPECIFIED TYPE: ICD-10-CM

## 2024-04-11 DIAGNOSIS — E03.9 HYPOTHYROIDISM, UNSPECIFIED TYPE: Primary | ICD-10-CM

## 2024-04-11 DIAGNOSIS — E07.9 THYROID DISEASE DURING PREGNANCY IN SECOND TRIMESTER (HHS-HCC): ICD-10-CM

## 2024-04-11 LAB
T4 SERPL-MCNC: 8.9 UG/DL (ref 4.5–11.1)
TSH SERPL-ACNC: 1.56 MIU/L (ref 0.44–3.98)

## 2024-04-11 PROCEDURE — 36415 COLL VENOUS BLD VENIPUNCTURE: CPT

## 2024-04-11 PROCEDURE — 84436 ASSAY OF TOTAL THYROXINE: CPT

## 2024-04-11 PROCEDURE — 84432 ASSAY OF THYROGLOBULIN: CPT

## 2024-04-11 PROCEDURE — 86800 THYROGLOBULIN ANTIBODY: CPT

## 2024-04-11 PROCEDURE — 84443 ASSAY THYROID STIM HORMONE: CPT

## 2024-04-11 PROCEDURE — 99204 OFFICE O/P NEW MOD 45 MIN: CPT | Performed by: STUDENT IN AN ORGANIZED HEALTH CARE EDUCATION/TRAINING PROGRAM

## 2024-04-11 ASSESSMENT — ENCOUNTER SYMPTOMS: CONSTITUTIONAL NEGATIVE: 1

## 2024-04-11 NOTE — PROGRESS NOTES
"Subjective   Patient ID: Anne Villa is a 29 y.o. female who presents for Hashimoto's Thyroiditis (Pt is 21  weeks pregnant , Midwife Madiha Light referred her to see you Last Tsh on 1/23/24 = 1.43  Currently taking Levothyroxine 24 mcg , no missed dosed doses and taking correctly )   Lab Results   Component Value Date    TSH 1.43 01/23/2024     HPI  The patient is a 29-year-old female who presents today for hypothyroidism on in pregnancy evaluation.  She states that she had preconception labs which showed a borderline slow TSH of 4.6 and was subsequently started on LT4 she had no symptoms of hypothyroidism at that time.  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        Objective   Physical Exam  Constitutional:       Appearance: Normal appearance.   Cardiovascular:      Rate and Rhythm: Normal rate and regular rhythm.   Pulmonary:      Effort: Pulmonary effort is normal.      Breath sounds: Normal breath sounds.   Abdominal:      Comments: Gravid uterus       Visit Vitals  BP 94/50   Ht 1.626 m (5' 4\")   Wt 65.8 kg (145 lb)   LMP  (LMP Unknown)   BMI 24.89 kg/m²   OB Status Pregnant   Smoking Status Never   BSA 1.72 m²        Assessment/Plan         30 yo female with  with history of asymptomatic subclinical hypothyroidism prior to current pregnancy , she was started on Lt4 shortly before concieving. She is   clinically and biochemically euthyroid on LT4 25 mcg which she takes appropriately.  Hashimoto's antibodies/TPO  negative  She has thyroglobulin antibodies but no thyroglobulin level done. This may indicate autoimmune thyroid disease vs related to mild thyromegaly.   Mild thyromegaly with no nodules    Labs today for dose adjustment   RTC 7/2024         "

## 2024-04-14 LAB
BILL ONLY-THYROGLOBULIN: NORMAL
THYROGLOB AB SERPL-ACNC: 3 IU/ML (ref 0–4)
THYROGLOB SERPL-MCNC: 9.2 NG/ML (ref 1.3–31.8)
THYROGLOB SERPL-MCNC: NORMAL NG/ML (ref 1.3–31.8)

## 2024-05-08 ENCOUNTER — ROUTINE PRENATAL (OUTPATIENT)
Dept: OBSTETRICS AND GYNECOLOGY | Facility: CLINIC | Age: 29
End: 2024-05-08
Payer: COMMERCIAL

## 2024-05-08 VITALS
WEIGHT: 152.38 LBS | DIASTOLIC BLOOD PRESSURE: 50 MMHG | BODY MASS INDEX: 26.16 KG/M2 | SYSTOLIC BLOOD PRESSURE: 100 MMHG

## 2024-05-08 DIAGNOSIS — F32.A DEPRESSION AFFECTING PREGNANCY (HHS-HCC): ICD-10-CM

## 2024-05-08 DIAGNOSIS — N63.10 MASS OF RIGHT BREAST, UNSPECIFIED QUADRANT: ICD-10-CM

## 2024-05-08 DIAGNOSIS — Z3A.25 25 WEEKS GESTATION OF PREGNANCY (HHS-HCC): Primary | ICD-10-CM

## 2024-05-08 DIAGNOSIS — O99.340 DEPRESSION AFFECTING PREGNANCY (HHS-HCC): ICD-10-CM

## 2024-05-08 PROBLEM — E03.8 SUBCLINICAL HYPOTHYROIDISM: Status: ACTIVE | Noted: 2024-05-08

## 2024-05-08 PROBLEM — E06.3 HASHIMOTO'S THYROIDITIS: Status: RESOLVED | Noted: 2024-03-19 | Resolved: 2024-05-08

## 2024-05-08 PROCEDURE — 0501F PRENATAL FLOW SHEET: CPT

## 2024-05-08 ASSESSMENT — EDINBURGH POSTNATAL DEPRESSION SCALE (EPDS)
THINGS HAVE BEEN GETTING ON TOP OF ME: NO, MOST OF THE TIME I HAVE COPED QUITE WELL
I HAVE FELT SCARED OR PANICKY FOR NO GOOD REASON: NO, NOT MUCH
I HAVE BEEN SO UNHAPPY THAT I HAVE HAD DIFFICULTY SLEEPING: NOT AT ALL
I HAVE BEEN SO UNHAPPY THAT I HAVE BEEN CRYING: ONLY OCCASIONALLY
TOTAL SCORE: 6
THE THOUGHT OF HARMING MYSELF HAS OCCURRED TO ME: NEVER
I HAVE BEEN ANXIOUS OR WORRIED FOR NO GOOD REASON: HARDLY EVER
I HAVE BLAMED MYSELF UNNECESSARILY WHEN THINGS WENT WRONG: NOT VERY OFTEN
I HAVE FELT SAD OR MISERABLE: NOT VERY OFTEN
I HAVE BEEN ABLE TO LAUGH AND SEE THE FUNNY SIDE OF THINGS: AS MUCH AS I ALWAYS COULD
I HAVE LOOKED FORWARD WITH ENJOYMENT TO THINGS: AS MUCH AS I EVER DID

## 2024-05-08 NOTE — PROGRESS NOTES
Subjective     Anne Villa is a 29 y.o.  at 25w2d with a working estimated date of delivery of 2024, by Ultrasound who presents for a routine prenatal visit. She denies vaginal bleeding, leakage of fluid, or contractions. Patient reports feeling fetal movement and compliance with Synthroid. though has not been taking PNV. Discussed importance of taking PNV and patient reports that she will start taking it today.     Patient has not been seen since 17 weeks. Discussed lapse in care and patient reports that she was sick and canceled last visit. Discussed visit frequency moving forward.     Patient seen by Dr. Curry and was told that she does not have Hashimoto's, but has subclinical hypothyroidism. TSH wnl, and no dose adjustment is needed at this time. Return visit with endo is scheduled in July.     Reviewed anatomy scan, wnl.     Patient has not yet completed breast US but plans to.     EPDS today 6. Patient reports not starting sertraline as she reports that she felt as though he mood changes were situational and they improved after visit.       Current Outpatient Medications on File Prior to Visit   Medication Sig Dispense Refill    levothyroxine (Synthroid, Levoxyl) 25 mcg tablet Take 1 tablet (25 mcg) by mouth once daily. 90 tablet 3     No current facility-administered medications on file prior to visit.        Her pregnancy is complicated by:  Medical Problems       Problem List       Anemia, unspecified    Overview Signed 2023 11:00 PM by Anjelica Cooper     Formatting of this note might be different from the original. This term is a replacement for an inactive term.         Anxiety    Fatigue    Lumbar disc herniation    Other acne    Pain in joint, shoulder region    Pain of right heel    Shortness of breath    Spasm of muscle of lower back    Encounter for supervision of normal first pregnancy in first trimester ([facility])    Overview Signed 2024 10:07 AM by Madiah DURAN  ARELIS Hopkins     Desires rr NIPS  Hashimotos, endo referral placed, TSH 1.43  Had Covid vaccine, declines booster  Depression, unmedicated- stopped sertraline with pregnancy         Lump of right breast    Overview Signed 3/13/2024 12:44 PM by ARELIS Gautam     Breast US ordered         Depression affecting pregnancy ([facility])    Overview Signed 3/13/2024 12:49 PM by ARELIS Gautam     3/13/24, EPDS 14, patient started on Sertraline 25 mg daily. Referral to ADON[facility] Haynes placed.   Repeat EPDS in 6-8 weeks  Patient is under care of a counselor at [facility].          Hashimoto's thyroiditis    Overview Addendum 3/19/2024  2:47 PM by ARELIS Gautam     Endocrinology apt 4/11, last tsh 1/23: 1.43                Objective   Weight: 69.1 kg (152 lb 6 oz)  Expected Total Weight Gain: 11.5 kg (25 lb)-16 kg (35 lb)   TWG: 10.1 kg (22 lb 6 oz)  Pregravid BMI: 22.30      BP: 100/50          Prenatal Labs:  Lab Results   Component Value Date    HGB 14.2 02/14/2024    HCT 42.0 02/14/2024     02/14/2024    ABO B 02/14/2024    LABRH POS 02/14/2024    NEISSGONOAMP Negative 01/17/2024    CHLAMTRACAMP Negative 01/17/2024    SYPHT Nonreactive 02/14/2024    HEPBSAG Nonreactive 02/14/2024    HIV1X2 Nonreactive 02/14/2024    URINECULTURE No growth 01/17/2024       Assessment/Plan   One hour, cbc, and TDAP at next visit  Reviewed s/sx of PTL, warning signs, and when to call provider  Follow up in 3 weeks for a routine prenatal visit.    ARELIS Gautam

## 2024-05-29 ENCOUNTER — APPOINTMENT (OUTPATIENT)
Dept: OBSTETRICS AND GYNECOLOGY | Facility: CLINIC | Age: 29
End: 2024-05-29
Payer: COMMERCIAL

## 2024-05-30 ENCOUNTER — TELEPHONE (OUTPATIENT)
Dept: OBSTETRICS AND GYNECOLOGY | Facility: CLINIC | Age: 29
End: 2024-05-30

## 2024-06-03 ENCOUNTER — APPOINTMENT (OUTPATIENT)
Dept: OBSTETRICS AND GYNECOLOGY | Facility: CLINIC | Age: 29
End: 2024-06-03
Payer: COMMERCIAL

## 2024-06-04 NOTE — PROGRESS NOTES
Subjective     Anne Villa is a 29 y.o.  at 29w1d with a working estimated date of delivery of 2024, by Ultrasound who presents for a routine prenatal visit. She denies vaginal bleeding, leakage of fluid, decreased fetal movements, or contractions.    Patient reports concerns for on again off again racing heart rate. Patient reports that symptoms started on Friday. Patient reports that when she began to notice symptoms she checked her pulse and her HR was hovering in the 90's- low 100's. Patient states that at times she would feel winded. Patient states that she did start to get anxious with symptoms and her HR increased to 140. Patient denies any chest pain. Patient has been increasing fluids and supplementing with electrolytes daily. Patient reports that resting HR is still in the 90's- 100's. Dicussed with patient normal physiological changes of pregnancy and increase in resting HR. HR currently 92 at rest and patient in no acute distress.  Dicussed daily magnesium supplement and option for cardiology referral if symptoms do not resolve. Patient agreeable and order placed.     Patient also has concerns for occasional headaches since Monday with good relief with Tylenol. Patient denies any visual disturbances, increased swelling, upper belly pain, heartburn, SOB, or chest pain and BP in office wnl.       One hour and CBC collected today.     Her pregnancy is complicated by:  Medical Problems       Problem List       Anemia, unspecified    Overview Signed 2023 11:00 PM by Anjelica Cooper     Formatting of this note might be different from the original. This term is a replacement for an inactive term.         Anxiety    Fatigue    Lumbar disc herniation    Other acne    Pain in joint, shoulder region    Pain of right heel    Shortness of breath    Spasm of muscle of lower back    Encounter for supervision of normal first pregnancy in first trimester ([facility])    Overview Addendum 2024   9:30 AM by ARELIS Gautam     rr NIPS  Subclinical hypothyroidism  Had Covid vaccine, declines booster  Depression, unmedicated- stopped sertraline with pregnancy         Lump of right breast    Overview Signed 3/13/2024 12:44 PM by ARELIS Gautam     Breast US ordered         Depression affecting pregnancy ([facility])    Overview Addendum 5/8/2024  9:31 AM by ARELIS Gautam     3/13/24, EPDS 14. EPDS 5/8/24, 6.  Patient is under care of a counselor at [facility].          Subclinical hypothyroidism    Overview Signed 5/8/2024  8:54 AM by ARELIS Gautam     Under care of Dr. Chakraborty. Previously dx as hashimoto's, though her endocrinology not hashimoto's.               Objective      TWG: 10.1 kg (22 lb 6 oz)  Pregravid BMI: 22.30               Prenatal Labs:  Lab Results   Component Value Date    HGB 14.2 02/14/2024    HCT 42.0 02/14/2024     02/14/2024    ABO B 02/14/2024    LABRH POS 02/14/2024    NEISSGONOAMP Negative 01/17/2024    CHLAMTRACAMP Negative 01/17/2024    SYPHT Nonreactive 02/14/2024    HEPBSAG Nonreactive 02/14/2024    HIV1X2 Nonreactive 02/14/2024    URINECULTURE No growth 01/17/2024       Assessment/Plan   Patient to continue to take Tylenol as directed for HA  Patient to begin daily magnesium supplementation. Patient to schedule with cardiology if racing HR continues.   Tdap at next visit  Review one hour and CBC  Follow up in 2 week for a routine prenatal visit.    ARELIS Gautam

## 2024-06-05 ENCOUNTER — ROUTINE PRENATAL (OUTPATIENT)
Dept: OBSTETRICS AND GYNECOLOGY | Facility: CLINIC | Age: 29
End: 2024-06-05
Payer: COMMERCIAL

## 2024-06-05 VITALS — SYSTOLIC BLOOD PRESSURE: 111 MMHG | BODY MASS INDEX: 27.29 KG/M2 | DIASTOLIC BLOOD PRESSURE: 70 MMHG | WEIGHT: 159 LBS

## 2024-06-05 DIAGNOSIS — Z3A.29 29 WEEKS GESTATION OF PREGNANCY (HHS-HCC): Primary | ICD-10-CM

## 2024-06-05 DIAGNOSIS — R51.9 PREGNANCY HEADACHE IN THIRD TRIMESTER (HHS-HCC): ICD-10-CM

## 2024-06-05 DIAGNOSIS — R00.0 RACING HEART BEAT: ICD-10-CM

## 2024-06-05 DIAGNOSIS — O26.893 PREGNANCY HEADACHE IN THIRD TRIMESTER (HHS-HCC): ICD-10-CM

## 2024-06-05 LAB
ERYTHROCYTE [DISTWIDTH] IN BLOOD BY AUTOMATED COUNT: 12.1 % (ref 11.5–14.5)
GLUCOSE 1H P 50 G GLC PO SERPL-MCNC: 115 MG/DL
HCT VFR BLD AUTO: 37.4 % (ref 36–46)
HGB BLD-MCNC: 11.9 G/DL (ref 12–16)
MCH RBC QN AUTO: 30.3 PG (ref 26–34)
MCHC RBC AUTO-ENTMCNC: 31.8 G/DL (ref 32–36)
MCV RBC AUTO: 95 FL (ref 80–100)
NRBC BLD-RTO: 0 /100 WBCS (ref 0–0)
PLATELET # BLD AUTO: 232 X10*3/UL (ref 150–450)
RBC # BLD AUTO: 3.93 X10*6/UL (ref 4–5.2)
REFLEX ADDED, ANEMIA PANEL: NORMAL
WBC # BLD AUTO: 8.3 X10*3/UL (ref 4.4–11.3)

## 2024-06-05 PROCEDURE — 36415 COLL VENOUS BLD VENIPUNCTURE: CPT

## 2024-06-05 PROCEDURE — 85027 COMPLETE CBC AUTOMATED: CPT

## 2024-06-05 PROCEDURE — 82947 ASSAY GLUCOSE BLOOD QUANT: CPT

## 2024-06-05 PROCEDURE — 0501F PRENATAL FLOW SHEET: CPT

## 2024-06-05 ASSESSMENT — EDINBURGH POSTNATAL DEPRESSION SCALE (EPDS)
TOTAL SCORE: 2
THE THOUGHT OF HARMING MYSELF HAS OCCURRED TO ME: NEVER
I HAVE BEEN ANXIOUS OR WORRIED FOR NO GOOD REASON: NO, NOT AT ALL
I HAVE BEEN ABLE TO LAUGH AND SEE THE FUNNY SIDE OF THINGS: AS MUCH AS I ALWAYS COULD
I HAVE FELT SAD OR MISERABLE: NO, NOT AT ALL
I HAVE FELT SCARED OR PANICKY FOR NO GOOD REASON: NO, NOT AT ALL
I HAVE LOOKED FORWARD WITH ENJOYMENT TO THINGS: AS MUCH AS I EVER DID
I HAVE BEEN SO UNHAPPY THAT I HAVE BEEN CRYING: NO, NEVER
THINGS HAVE BEEN GETTING ON TOP OF ME: YES, SOMETIMES I HAVEN'T BEEN COPING AS WELL AS USUAL
I HAVE BLAMED MYSELF UNNECESSARILY WHEN THINGS WENT WRONG: NO, NEVER
I HAVE BEEN SO UNHAPPY THAT I HAVE HAD DIFFICULTY SLEEPING: NOT AT ALL

## 2024-06-12 ENCOUNTER — APPOINTMENT (OUTPATIENT)
Dept: OBSTETRICS AND GYNECOLOGY | Facility: CLINIC | Age: 29
End: 2024-06-12
Payer: COMMERCIAL

## 2024-06-17 PROBLEM — R53.83 FATIGUE: Status: RESOLVED | Noted: 2023-11-08 | Resolved: 2024-06-17

## 2024-06-17 PROBLEM — M79.671 PAIN OF RIGHT HEEL: Status: RESOLVED | Noted: 2023-11-08 | Resolved: 2024-06-17

## 2024-06-26 ENCOUNTER — APPOINTMENT (OUTPATIENT)
Dept: OBSTETRICS AND GYNECOLOGY | Facility: CLINIC | Age: 29
End: 2024-06-26
Payer: COMMERCIAL

## 2024-06-26 VITALS — SYSTOLIC BLOOD PRESSURE: 103 MMHG | DIASTOLIC BLOOD PRESSURE: 64 MMHG | BODY MASS INDEX: 27.6 KG/M2 | WEIGHT: 160.8 LBS

## 2024-06-26 DIAGNOSIS — Z3A.32 32 WEEKS GESTATION OF PREGNANCY (HHS-HCC): Primary | ICD-10-CM

## 2024-06-26 DIAGNOSIS — R00.0 RACING HEART BEAT: ICD-10-CM

## 2024-06-26 PROCEDURE — 0501F PRENATAL FLOW SHEET: CPT

## 2024-06-26 NOTE — PROGRESS NOTES
No concerns, wishes to wait until next visit for Tdap.    Allen Angeles MA    Subjective     Anne Wu is a 29 y.o.  at 32w2d with a working estimated date of delivery of 2024, by Ultrasound who presents for a routine prenatal visit. She denies vaginal bleeding, leakage of fluid, decreased fetal movements, or contractions.    Patient taking a hypno-birthing class in July and practicing with taya.     Patient reports that racing heart rate has decreased dramatically and is only occurring 1-2 times a week for 1 to 2 minutes.  Patient had previously scheduled cardiology visit though canceled appointment with improvement of symptoms.  Patient denies any additional symptoms during periods of racing heart rate including shortness of breath or chest pain.  Patient daily with magnesium.    1 hour gtt and cbc reviewed and wnl.     Her pregnancy is complicated by:  Medical Problems       Problem List       Anxiety    Lumbar disc herniation    Acne    Spasm of muscle of lower back    Encounter for supervision of normal first pregnancy in first trimester ([facility])    Overview Addendum 2024  9:30 AM by ARELIS Gautam     rr NIPS  Subclinical hypothyroidism  Had Covid vaccine, declines booster  Depression, unmedicated- stopped sertraline with pregnancy         Lump of right breast    Overview Signed 3/13/2024 12:44 PM by ARELIS Gautam     Breast US ordered         Depression affecting pregnancy ([facility])    Overview Addendum 2024  9:31 AM by ARELIS Gautam     3/13/24, EPDS 14. EPDS 24, 6.  Patient is under care of a counselor at [facility].          Subclinical hypothyroidism    Overview Signed 2024  8:54 AM by ARELIS Gautam     Under care of Dr. Chakraborty. Previously dx as hashimoto's, though her endocrinology not hashimoto's.               Objective   Weight: 72.9 kg (160 lb 12.8 oz)  TW kg (30 lb 12.8 oz)  Pregravid BMI:  22.30    BP: 103/64          Prenatal Labs:  Lab Results   Component Value Date    HGB 11.9 (L) 06/05/2024    HCT 37.4 06/05/2024     06/05/2024    ABO B 02/14/2024    LABRH POS 02/14/2024    NEISSGONOAMP Negative 01/17/2024    CHLAMTRACAMP Negative 01/17/2024    SYPHT Nonreactive 02/14/2024    HEPBSAG Nonreactive 02/14/2024    HIV1X2 Nonreactive 02/14/2024    URINECULTURE No growth 01/17/2024       Assessment/Plan   Collect birth preferences at next visit  Working on breast pump.   Has patient had imaging from breast lump?  TDAP next visit  Reviewed warning signs, fetal movement counts, and when to call provider  Follow up in 2 weeks for a routine prenatal visit.    ARELIS Gautam

## 2024-07-01 ENCOUNTER — APPOINTMENT (OUTPATIENT)
Dept: CARDIOLOGY | Facility: CLINIC | Age: 29
End: 2024-07-01
Payer: COMMERCIAL

## 2024-07-10 ENCOUNTER — APPOINTMENT (OUTPATIENT)
Dept: OBSTETRICS AND GYNECOLOGY | Facility: CLINIC | Age: 29
End: 2024-07-10
Payer: COMMERCIAL

## 2024-07-10 VITALS — BODY MASS INDEX: 28.43 KG/M2 | DIASTOLIC BLOOD PRESSURE: 68 MMHG | SYSTOLIC BLOOD PRESSURE: 107 MMHG | WEIGHT: 165.6 LBS

## 2024-07-10 DIAGNOSIS — N63.10 MASS OF RIGHT BREAST, UNSPECIFIED QUADRANT: ICD-10-CM

## 2024-07-10 DIAGNOSIS — Z3A.34 34 WEEKS GESTATION OF PREGNANCY (HHS-HCC): Primary | ICD-10-CM

## 2024-07-10 DIAGNOSIS — E03.8 SUBCLINICAL HYPOTHYROIDISM: ICD-10-CM

## 2024-07-10 DIAGNOSIS — Z71.85 IMMUNIZATION COUNSELING: ICD-10-CM

## 2024-07-10 DIAGNOSIS — Z28.21 VACCINATION NOT CARRIED OUT BECAUSE OF PATIENT REFUSAL: ICD-10-CM

## 2024-07-10 PROCEDURE — 0501F PRENATAL FLOW SHEET: CPT

## 2024-07-10 NOTE — PROGRESS NOTES
Subjective     Anne Wu is a 29 y.o.  at 34w2d with a working estimated date of delivery of 2024, by Ultrasound who presents for a routine prenatal visit. She denies vaginal bleeding, leakage of fluid, decreased fetal movements, or contractions.    Anne Wu was counseled on the recommendation for and benefits of TDaP vaccination during pregnancy.  We discussed the passive immunity that occurs after maternal vaccination during pregnancy, and the antibodies that cross the placenta to the fetus to protect baby in the first few months of life.  Babies do not receive their first vaccination for pertussis/whooping cough until 2 months of life, during which the baby is vulnerable to this bacterial infection.  Whooping cough can cause severe and even life-threatening infections, and maternal vaccination between 27 and 36 weeks of pregnancy is the best method to protect baby from Whooping cough until they are eligible for the vaccination. After discussion the patient declined the vaccine. >5 minutes were spent on counseling related to vaccine recommendations and safety    Patient has not yet completed breast imaging as she wants to wait until she has met her deductible. Patient encouraged to have completed asap.    Patient has breast pump and still is undecided on Peds.     Patient has follow up with Dr. Curry at the end of the month for hypothyroidism.     Her pregnancy is complicated by:  Medical Problems       Problem List       Anxiety    Lumbar disc herniation    Acne    Spasm of muscle of lower back    Encounter for supervision of normal first pregnancy in first trimester ([facility])    Overview Addendum 2024  9:30 AM by ARELIS Gautam     rr NIPS  Subclinical hypothyroidism  Had Covid vaccine, declines booster  Depression, unmedicated- stopped sertraline with pregnancy         Lump of right breast    Overview Signed 3/13/2024 12:44 PM by ARELIS Gautam     Breast US  ordered         Depression affecting pregnancy ([facility])    Overview Addendum 2024  9:31 AM by ARELIS Gautam     3/13/24, EPDS 14. EPDS 24, 6.  Patient is under care of a counselor at [facility].          Subclinical hypothyroidism    Overview Signed 2024  8:54 AM by ARELIS Gautam     Under care of Dr. Chakraborty. Previously dx as hashimoto's, though her endocrinology not hashimoto's.               Objective   Weight: 75.1 kg (165 lb 9.6 oz)  TW.1 kg (35 lb 9.6 oz)  Pregravid BMI: 22.30    BP: 107/68          Prenatal Labs:  Lab Results   Component Value Date    HGB 11.9 (L) 2024    HCT 37.4 2024     2024    ABO B 2024    LABRH POS 2024    NEISSGONOAMP Negative 2024    CHLAMTRACAMP Negative 2024    SYPHT Nonreactive 2024    HEPBSAG Nonreactive 2024    HIV1X2 Nonreactive 2024    URINECULTURE No growth 2024       Assessment/Plan   GBS and consent next visit  Reviewed warning signs, fetal movement counts, and when to call provider  Follow up in 2 week for a routine prenatal visit.    ARELIS Gautam

## 2024-07-17 ENCOUNTER — APPOINTMENT (OUTPATIENT)
Dept: OBSTETRICS AND GYNECOLOGY | Facility: CLINIC | Age: 29
End: 2024-07-17
Payer: COMMERCIAL

## 2024-07-24 ENCOUNTER — APPOINTMENT (OUTPATIENT)
Dept: OBSTETRICS AND GYNECOLOGY | Facility: CLINIC | Age: 29
End: 2024-07-24
Payer: COMMERCIAL

## 2024-07-24 VITALS — SYSTOLIC BLOOD PRESSURE: 105 MMHG | BODY MASS INDEX: 28.97 KG/M2 | DIASTOLIC BLOOD PRESSURE: 69 MMHG | WEIGHT: 168.8 LBS

## 2024-07-24 DIAGNOSIS — E03.8 SUBCLINICAL HYPOTHYROIDISM: ICD-10-CM

## 2024-07-24 DIAGNOSIS — Z3A.36 36 WEEKS GESTATION OF PREGNANCY (HHS-HCC): Primary | ICD-10-CM

## 2024-07-24 DIAGNOSIS — F32.A DEPRESSION AFFECTING PREGNANCY (HHS-HCC): ICD-10-CM

## 2024-07-24 DIAGNOSIS — O99.340 DEPRESSION AFFECTING PREGNANCY (HHS-HCC): ICD-10-CM

## 2024-07-24 DIAGNOSIS — N63.10 MASS OF RIGHT BREAST, UNSPECIFIED QUADRANT: ICD-10-CM

## 2024-07-24 DIAGNOSIS — Z34.03 ENCOUNTER FOR SUPERVISION OF NORMAL FIRST PREGNANCY IN THIRD TRIMESTER (HHS-HCC): ICD-10-CM

## 2024-07-24 PROCEDURE — 87081 CULTURE SCREEN ONLY: CPT

## 2024-07-24 PROCEDURE — 0501F PRENATAL FLOW SHEET: CPT

## 2024-07-24 NOTE — PROGRESS NOTES
Subjective     Anne Wu is a 29 y.o.  at 36w2d with a working estimated date of delivery of 2024, by Ultrasound who presents for a routine prenatal visit. She denies vaginal bleeding, leakage of fluid, decreased fetal movements, or contractions.    Consents obtained, GBS collected. No PCN allergy. Labor precautions reviewed. Patient given emergency number and instructed to call prior to arriving at the hospital.    Patient has not yet completed breast US and encouraged to have completed.       Her pregnancy is complicated by:  Medical Problems       Problem List       Anxiety    Lumbar disc herniation    Acne    Spasm of muscle of lower back    Encounter for supervision of normal first pregnancy in first trimester ([facility])    Overview Addendum 2024  9:30 AM by ARELIS Gautam     rr NIPS  Subclinical hypothyroidism  Had Covid vaccine, declines booster  Depression, unmedicated- stopped sertraline with pregnancy         Lump of right breast    Overview Signed 3/13/2024 12:44 PM by ARELIS Gautam     Breast US ordered         Depression affecting pregnancy ([facility])    Overview Addendum 2024  9:31 AM by ARELIS Gautam     3/13/24, EPDS 14. EPDS 24, 6.  Patient is under care of a counselor at [facility].          Subclinical hypothyroidism    Overview Signed 2024  8:54 AM by ARELIS Gautam     Under care of Dr. Chakraborty. Previously dx as hashimoto's, though her endocrinology not hashimoto's.               Objective   Weight: 76.6 kg (168 lb 12.8 oz)  TW.6 kg (38 lb 12.8 oz)  Pregravid BMI: 22.30    BP: 105/69          Prenatal Labs:  Lab Results   Component Value Date    HGB 11.9 (L) 2024    HCT 37.4 2024     2024    ABO B 2024    LABRH POS 2024    NEISSGONOAMP Negative 2024    CHLAMTRACAMP Negative 2024    SYPHT Nonreactive 2024    HEPBSAG Nonreactive 2024     HIV1X2 Nonreactive 02/14/2024    URINECULTURE No growth 01/17/2024       Assessment/Plan     Reviewed s/sx of labor, warning signs, fetal movement counts, and when to call provider  Follow up in 1 week for a routine prenatal visit.    SYDNEY Gautam-SYDNEE

## 2024-07-26 LAB — GP B STREP GENITAL QL CULT: NORMAL

## 2024-07-29 ENCOUNTER — APPOINTMENT (OUTPATIENT)
Dept: ENDOCRINOLOGY | Facility: CLINIC | Age: 29
End: 2024-07-29
Payer: COMMERCIAL

## 2024-07-29 ENCOUNTER — LAB (OUTPATIENT)
Dept: LAB | Facility: LAB | Age: 29
End: 2024-07-29
Payer: COMMERCIAL

## 2024-07-29 VITALS
SYSTOLIC BLOOD PRESSURE: 90 MMHG | WEIGHT: 169 LBS | DIASTOLIC BLOOD PRESSURE: 56 MMHG | HEIGHT: 64 IN | BODY MASS INDEX: 28.85 KG/M2

## 2024-07-29 DIAGNOSIS — E03.9 HYPOTHYROIDISM, UNSPECIFIED TYPE: ICD-10-CM

## 2024-07-29 DIAGNOSIS — O99.282 THYROID DISEASE DURING PREGNANCY IN SECOND TRIMESTER (HHS-HCC): ICD-10-CM

## 2024-07-29 DIAGNOSIS — E07.9 THYROID DISEASE DURING PREGNANCY IN SECOND TRIMESTER (HHS-HCC): ICD-10-CM

## 2024-07-29 DIAGNOSIS — E03.9 HYPOTHYROIDISM, UNSPECIFIED TYPE: Primary | ICD-10-CM

## 2024-07-29 LAB
T4 FREE SERPL-MCNC: 0.71 NG/DL (ref 0.61–1.12)
TSH SERPL-ACNC: 1.61 MIU/L (ref 0.44–3.98)

## 2024-07-29 PROCEDURE — 36415 COLL VENOUS BLD VENIPUNCTURE: CPT

## 2024-07-29 PROCEDURE — 3008F BODY MASS INDEX DOCD: CPT | Performed by: STUDENT IN AN ORGANIZED HEALTH CARE EDUCATION/TRAINING PROGRAM

## 2024-07-29 PROCEDURE — 84439 ASSAY OF FREE THYROXINE: CPT

## 2024-07-29 PROCEDURE — 84443 ASSAY THYROID STIM HORMONE: CPT

## 2024-07-29 PROCEDURE — 99214 OFFICE O/P EST MOD 30 MIN: CPT | Performed by: STUDENT IN AN ORGANIZED HEALTH CARE EDUCATION/TRAINING PROGRAM

## 2024-07-29 NOTE — PROGRESS NOTES
"Subjective   Patient ID: Anne Wu is a 29 y.o. female who presents for Hypothyroidism (37 weeks pregnant , feeling good.  Labs done 4/11/24 free T4= 8.9,  levothyroxine 25 mcg every day , no missed dose, taking correctly)   Lab Results   Component Value Date    TSH 1.56 04/11/2024      HPI  The patient is a 29-year-old female who presents today for hypothyroidism  in pregnancy follow up  She is 34 weeks pregnant now   She is doing well , has no new concerns       History :  She states that she had preconception labs which showed a borderline slow TSH of 4.6 and was subsequently started on LT4 she had no symptoms of hypothyroidism at that time.  TPO was negative , TG antibodies positive no TG checked   She also had US which showed mild thyromegaly , no nodules   She is currently 21 weeks pregnant with a boy      She is a physical therapist at  and [employer]   Her  is a [facility] resident   Review of Systems    Objective   Physical Exam  Constitutional:       Appearance: Normal appearance.   Cardiovascular:      Rate and Rhythm: Normal rate and regular rhythm.   Pulmonary:      Effort: Pulmonary effort is normal.      Breath sounds: Normal breath sounds.   Abdominal:      Comments: Gravid uterus   Musculoskeletal:      Cervical back: Neck supple.   Neurological:      General: No focal deficit present.      Mental Status: She is alert.      Visit Vitals  BP 90/56   Ht 1.626 m (5' 4\")   Wt 76.7 kg (169 lb)   LMP  (LMP Unknown)   BMI 29.01 kg/m²   OB Status Pregnant   Smoking Status Never   BSA 1.86 m²        Assessment/Plan         30 yo female with  with history of asymptomatic subclinical hypothyroidism prior to current pregnancy , she was started on Lt4 shortly before concieving. She is   clinically and biochemically euthyroid on LT4 25 mcg which she takes appropriately. She is due for recheck   Hashimoto's antibodies/TPO  negative, Tg ab negative ( was positive in 2023 ,no TG level at that time). She does not have " Hashimoto's disease    Mild thyromegaly with no nodules     Labs today for dose adjustment   RTC 6 weeks post partum virtually

## 2024-07-31 ENCOUNTER — APPOINTMENT (OUTPATIENT)
Dept: OBSTETRICS AND GYNECOLOGY | Facility: CLINIC | Age: 29
End: 2024-07-31
Payer: COMMERCIAL

## 2024-07-31 VITALS — BODY MASS INDEX: 29.21 KG/M2 | WEIGHT: 170.2 LBS | SYSTOLIC BLOOD PRESSURE: 112 MMHG | DIASTOLIC BLOOD PRESSURE: 66 MMHG

## 2024-07-31 DIAGNOSIS — Z3A.37 37 WEEKS GESTATION OF PREGNANCY (HHS-HCC): Primary | ICD-10-CM

## 2024-07-31 DIAGNOSIS — F32.A DEPRESSION AFFECTING PREGNANCY (HHS-HCC): ICD-10-CM

## 2024-07-31 DIAGNOSIS — N63.10 MASS OF RIGHT BREAST, UNSPECIFIED QUADRANT: ICD-10-CM

## 2024-07-31 DIAGNOSIS — Z34.01 ENCOUNTER FOR SUPERVISION OF NORMAL FIRST PREGNANCY IN FIRST TRIMESTER (HHS-HCC): ICD-10-CM

## 2024-07-31 DIAGNOSIS — O99.340 DEPRESSION AFFECTING PREGNANCY (HHS-HCC): ICD-10-CM

## 2024-07-31 DIAGNOSIS — E03.8 SUBCLINICAL HYPOTHYROIDISM: ICD-10-CM

## 2024-07-31 PROCEDURE — 0501F PRENATAL FLOW SHEET: CPT

## 2024-07-31 NOTE — PROGRESS NOTES
Subjective     Anne Wu is a 29 y.o.  at 37w2d with a working estimated date of delivery of 2024, by Ultrasound who presents for a routine prenatal visit. She denies vaginal bleeding, leakage of fluid, decreased fetal movements, or contractions.    GBS results reviewed and wnl.     Her pregnancy is complicated by:  Medical Problems       Problem List       Anxiety    Lumbar disc herniation    Acne    Spasm of muscle of lower back    Encounter for supervision of normal first pregnancy in first trimester ([facility])    Overview Addendum 2024  9:30 AM by ARELIS Gautam     rr NIPS  Subclinical hypothyroidism  Had Covid vaccine, declines booster  Depression, unmedicated- stopped sertraline with pregnancy         Lump of right breast    Overview Signed 3/13/2024 12:44 PM by ARELIS Gautam     Breast US ordered         Depression affecting pregnancy ([facility])    Overview Addendum 2024  9:31 AM by ARELIS Gautam     3/13/24, EPDS 14. EPDS 24, 6.  Patient is under care of a counselor at [facility].          Subclinical hypothyroidism    Overview Signed 2024  8:54 AM by ARELIS Gautam     Under care of Dr. Chakraborty. Previously dx as hashimoto's, though her endocrinology not hashimoto's.               Objective   Weight: 77.2 kg (170 lb 3.2 oz)  TW.2 kg (40 lb 3.2 oz)  Pregravid BMI: 22.30    BP: 112/66          Prenatal Labs:  Lab Results   Component Value Date    HGB 11.9 (L) 2024    HCT 37.4 2024     2024    ABO B 2024    LABRH POS 2024    NEISSGONOAMP Negative 2024    CHLAMTRACAMP Negative 2024    SYPHT Nonreactive 2024    HEPBSAG Nonreactive 2024    HIV1X2 Nonreactive 2024    URINECULTURE No growth 2024       Assessment/Plan   Patient encouraged to complete breast US  Collect birth preferences  Follow up in 1 week for a routine prenatal  visit.    Madiha Hopkins, APRN-CNM

## 2024-08-01 ENCOUNTER — HOSPITAL ENCOUNTER (OUTPATIENT)
Dept: RADIOLOGY | Facility: CLINIC | Age: 29
Discharge: HOME | End: 2024-08-01
Payer: COMMERCIAL

## 2024-08-01 DIAGNOSIS — N63.10 MASS OF RIGHT BREAST, UNSPECIFIED QUADRANT: ICD-10-CM

## 2024-08-01 PROCEDURE — 76982 USE 1ST TARGET LESION: CPT | Mod: RT

## 2024-08-01 PROCEDURE — 76642 ULTRASOUND BREAST LIMITED: CPT | Mod: RIGHT SIDE | Performed by: RADIOLOGY

## 2024-08-01 PROCEDURE — 76642 ULTRASOUND BREAST LIMITED: CPT | Mod: RT

## 2024-08-02 DIAGNOSIS — N63.10 MASS OF RIGHT BREAST, UNSPECIFIED QUADRANT: Primary | ICD-10-CM

## 2024-08-07 ENCOUNTER — APPOINTMENT (OUTPATIENT)
Dept: RADIOLOGY | Facility: CLINIC | Age: 29
End: 2024-08-07
Payer: COMMERCIAL

## 2024-08-07 ENCOUNTER — APPOINTMENT (OUTPATIENT)
Dept: OBSTETRICS AND GYNECOLOGY | Facility: CLINIC | Age: 29
End: 2024-08-07
Payer: COMMERCIAL

## 2024-08-14 ENCOUNTER — APPOINTMENT (OUTPATIENT)
Dept: OBSTETRICS AND GYNECOLOGY | Facility: CLINIC | Age: 29
End: 2024-08-14
Payer: COMMERCIAL

## 2024-08-14 VITALS — DIASTOLIC BLOOD PRESSURE: 68 MMHG | BODY MASS INDEX: 29.52 KG/M2 | WEIGHT: 172 LBS | SYSTOLIC BLOOD PRESSURE: 124 MMHG

## 2024-08-14 DIAGNOSIS — E03.8 SUBCLINICAL HYPOTHYROIDISM: ICD-10-CM

## 2024-08-14 DIAGNOSIS — N63.10 MASS OF RIGHT BREAST, UNSPECIFIED QUADRANT: ICD-10-CM

## 2024-08-14 DIAGNOSIS — Z34.03 ENCOUNTER FOR SUPERVISION OF NORMAL FIRST PREGNANCY IN THIRD TRIMESTER (HHS-HCC): Primary | ICD-10-CM

## 2024-08-14 DIAGNOSIS — Z3A.39 39 WEEKS GESTATION OF PREGNANCY (HHS-HCC): ICD-10-CM

## 2024-08-14 PROCEDURE — 0501F PRENATAL FLOW SHEET: CPT

## 2024-08-14 NOTE — PROGRESS NOTES
Subjective     Anne Wu is a 29 y.o.  at 39w2d with a working estimated date of delivery of 2024, by Ultrasound who presents for a routine prenatal visit. She denies vaginal bleeding, leakage of fluid, decreased fetal movements, or contractions.    Breast US reviewed- cat 3 likely fibroadenoma. Order placed for follow up US in 6 months.    Patient reports overall feeling well. Patient continues to desires to await spontaneous labor, though is open to IOL sometime during 41 weeks.     Her pregnancy is complicated by:  Medical Problems       Problem List       Anxiety    Lumbar disc herniation    Acne    Spasm of muscle of lower back    Encounter for supervision of normal first pregnancy in first trimester ([facility])    Overview Addendum 2024  9:30 AM by ARELIS Gautam     rr NIPS  Subclinical hypothyroidism  Had Covid vaccine, declines booster  Depression, unmedicated- stopped sertraline with pregnancy         Lump of right breast    Overview Signed 3/13/2024 12:44 PM by ARELIS Gautam     Breast US ordered         Depression affecting pregnancy ([facility])    Overview Addendum 2024  9:31 AM by ARELIS Gautam     3/13/24, EPDS 14. EPDS 24, 6.  Patient is under care of a counselor at [facility].          Subclinical hypothyroidism    Overview Signed 2024  8:54 AM by ARELIS Gautam     Under care of Dr. Chakraborty. Previously dx as hashimoto's, though her endocrinology not hashimoto's.               Objective   Weight: 78 kg (172 lb)  TW.1 kg (42 lb)  Pregravid BMI: 22.30    BP: 124/68          Prenatal Labs:  Lab Results   Component Value Date    HGB 11.9 (L) 2024    HCT 37.4 2024     2024    ABO B 2024    LABRH POS 2024    NEISSGONOAMP Negative 2024    CHLAMTRACAMP Negative 2024    SYPHT Nonreactive 2024    HEPBSAG Nonreactive 2024    HIV1X2 Nonreactive  2024    URINECULTURE No growth 2024       Assessment/Plan   Schedule 41 week IOL and  surveillance   Reviewed s/sx of labor, warning signs, fetal movement counts, and when to call provider  Follow up in 1 week for a routine prenatal visit.    SYDNEY Gautam-SYDNEE

## 2024-08-19 ENCOUNTER — APPOINTMENT (OUTPATIENT)
Dept: ENDOCRINOLOGY | Facility: CLINIC | Age: 29
End: 2024-08-19
Payer: COMMERCIAL

## 2024-08-21 ENCOUNTER — PREP FOR PROCEDURE (OUTPATIENT)
Dept: OBSTETRICS AND GYNECOLOGY | Facility: CLINIC | Age: 29
End: 2024-08-21

## 2024-08-21 ENCOUNTER — APPOINTMENT (OUTPATIENT)
Dept: OBSTETRICS AND GYNECOLOGY | Facility: CLINIC | Age: 29
End: 2024-08-21
Payer: COMMERCIAL

## 2024-08-21 VITALS — DIASTOLIC BLOOD PRESSURE: 75 MMHG | SYSTOLIC BLOOD PRESSURE: 114 MMHG | BODY MASS INDEX: 29.52 KG/M2 | WEIGHT: 172 LBS

## 2024-08-21 DIAGNOSIS — N63.10 MASS OF RIGHT BREAST, UNSPECIFIED QUADRANT: ICD-10-CM

## 2024-08-21 DIAGNOSIS — Z3A.40 40 WEEKS GESTATION OF PREGNANCY (HHS-HCC): Primary | ICD-10-CM

## 2024-08-21 DIAGNOSIS — O99.340 DEPRESSION AFFECTING PREGNANCY (HHS-HCC): ICD-10-CM

## 2024-08-21 DIAGNOSIS — Z34.90 ENCOUNTER FOR INDUCTION OF LABOR (HHS-HCC): Primary | ICD-10-CM

## 2024-08-21 DIAGNOSIS — Z34.03 ENCOUNTER FOR SUPERVISION OF NORMAL FIRST PREGNANCY IN THIRD TRIMESTER (HHS-HCC): ICD-10-CM

## 2024-08-21 DIAGNOSIS — F32.A DEPRESSION AFFECTING PREGNANCY (HHS-HCC): ICD-10-CM

## 2024-08-21 PROCEDURE — 0501F PRENATAL FLOW SHEET: CPT

## 2024-08-21 NOTE — PROGRESS NOTES
Subjective     Anne Wu is a 29 y.o.  at 40w2d with a working estimated date of delivery of 2024, by Ultrasound who presents for a routine prenatal visit. She denies vaginal bleeding, leakage of fluid, decreased fetal movements, or contractions.    The need, benefits, and risks of induction were discussed with the patient.  Induction methods were reviewed including CRB, Cytotec, and Pitocin.  Reasonable alternatives to induction were discussed, as well as the risks of remaining pregnant.  All patient questions answered.  Desires to await natural labor.  FM counts stressed.  This patient has been counseled regarding post dates pregnancy, including increased risk of  mortality, fetal distress, birth complications, fetal asphyxia, MSF, etc.  Pt. is aware that with the desire to remain pregnant past her due date, it is recommended to begin BPP's twice weekly beginning at 41 weeks and a complete a growth US.  In addition, patient was counseled that delivery by 41 weeks reduces the risk of fetal complications and is recommended by [facility] and [facility].   IOL scheduled 41.1 for 24 at 0800 with CHRIS Watson.     Patient desires membrane sweep today. R/B/A reviewed. Patient tolerated sweep well. /-2.       Her pregnancy is complicated by:  Medical Problems       Problem List       Anxiety    Lumbar disc herniation    Acne    Spasm of muscle of lower back    Encounter for supervision of normal first pregnancy in first trimester ([facility])    Overview Addendum 2024  9:30 AM by ARELIS Gautam     rr NIPS  Subclinical hypothyroidism  Had Covid vaccine, declines booster  Depression, unmedicated- stopped sertraline with pregnancy         Lump of right breast    Overview Addendum 2024  9:15 AM by ARELIS Gautam     Cat 3 breast US- likely fibroadenoma. Repeat breast US scheduled rec in 6 mo and order placed.          Depression affecting pregnancy ([facility])    Overview  Addendum 2024  9:31 AM by ARELIS Gautam     3/13/24, EPDS 14. EPDS 24, 6.  Patient is under care of a counselor at [facility].          Subclinical hypothyroidism    Overview Signed 2024  8:54 AM by ARELIS Gautam     Under care of Dr. Chakraborty. Previously dx as hashimoto's, though her endocrinology not hashimoto's.               Objective   Weight: 78 kg (172 lb)  TW.1 kg (42 lb)  Pregravid BMI: 22.30    BP: 114/75          Prenatal Labs:  Lab Results   Component Value Date    HGB 11.9 (L) 2024    HCT 37.4 2024     2024    ABO B 2024    LABRH POS 2024    NEISSGONOAMP Negative 2024    CHLAMTRACAMP Negative 2024    SYPHT Nonreactive 2024    HEPBSAG Nonreactive 2024    HIV1X2 Nonreactive 2024    URINECULTURE No growth 2024       Assessment/Plan   BPP scheduled for  at 1145 for postdates  IOL scheduled for  at 0800 with CHRIS Watson   Reviewed s/sx of labor, warning signs, fetal movement counts, and when to call provider  Follow up in 1 week for a routine prenatal visit if she does not proceed with IOL.     ARELIS Gautam

## 2024-08-22 ENCOUNTER — HOSPITAL ENCOUNTER (INPATIENT)
Facility: HOSPITAL | Age: 29
LOS: 2 days | Discharge: HOME | End: 2024-08-24
Attending: OBSTETRICS & GYNECOLOGY | Admitting: ADVANCED PRACTICE MIDWIFE
Payer: COMMERCIAL

## 2024-08-22 DIAGNOSIS — R52 POSTPARTUM PAIN (HHS-HCC): Primary | ICD-10-CM

## 2024-08-22 DIAGNOSIS — K59.00 CONSTIPATION, UNSPECIFIED CONSTIPATION TYPE: ICD-10-CM

## 2024-08-22 LAB
ABO GROUP (TYPE) IN BLOOD: NORMAL
ANTIBODY SCREEN: NORMAL
BASE EXCESS BLDCOV CALC-SCNC: -9.9 MMOL/L (ref -8.1–-0.5)
BODY TEMPERATURE: ABNORMAL
ERYTHROCYTE [DISTWIDTH] IN BLOOD BY AUTOMATED COUNT: 14.6 % (ref 11.5–14.5)
HCO3 BLDCOV-SCNC: 16.7 MMOL/L (ref 16–26)
HCT VFR BLD AUTO: 36.8 % (ref 36–46)
HGB BLD-MCNC: 11.7 G/DL (ref 12–16)
INHALED O2 CONCENTRATION: 21 %
MCH RBC QN AUTO: 25.2 PG (ref 26–34)
MCHC RBC AUTO-ENTMCNC: 31.8 G/DL (ref 32–36)
MCV RBC AUTO: 79 FL (ref 80–100)
NRBC BLD-RTO: 0 /100 WBCS (ref 0–0)
OXYHGB MFR BLDCOV: 71.5 % (ref 94–98)
PCO2 BLDCOV: 38 MM HG (ref 22–53)
PH BLDCOV: 7.25 PH (ref 7.19–7.47)
PLATELET # BLD AUTO: 217 X10*3/UL (ref 150–450)
PO2 BLDCOV: 41 MM HG (ref 13–37)
RBC # BLD AUTO: 4.65 X10*6/UL (ref 4–5.2)
RH FACTOR (ANTIGEN D): NORMAL
SAO2 % BLDCOV: 73 % (ref 16–84)
TREPONEMA PALLIDUM IGG+IGM AB [PRESENCE] IN SERUM OR PLASMA BY IMMUNOASSAY: NONREACTIVE
WBC # BLD AUTO: 16.1 X10*3/UL (ref 4.4–11.3)

## 2024-08-22 PROCEDURE — 36415 COLL VENOUS BLD VENIPUNCTURE: CPT | Performed by: ADVANCED PRACTICE MIDWIFE

## 2024-08-22 PROCEDURE — 7100000016 HC LABOR RECOVERY PER HOUR

## 2024-08-22 PROCEDURE — 51702 INSERT TEMP BLADDER CATH: CPT

## 2024-08-22 PROCEDURE — 85027 COMPLETE CBC AUTOMATED: CPT | Performed by: ADVANCED PRACTICE MIDWIFE

## 2024-08-22 PROCEDURE — 0KQM0ZZ REPAIR PERINEUM MUSCLE, OPEN APPROACH: ICD-10-PCS

## 2024-08-22 PROCEDURE — 86780 TREPONEMA PALLIDUM: CPT | Mod: STJLAB | Performed by: ADVANCED PRACTICE MIDWIFE

## 2024-08-22 PROCEDURE — 2500000004 HC RX 250 GENERAL PHARMACY W/ HCPCS (ALT 636 FOR OP/ED): Performed by: ADVANCED PRACTICE MIDWIFE

## 2024-08-22 PROCEDURE — 59409 OBSTETRICAL CARE: CPT

## 2024-08-22 PROCEDURE — 82805 BLOOD GASES W/O2 SATURATION: CPT | Performed by: ADVANCED PRACTICE MIDWIFE

## 2024-08-22 PROCEDURE — 1220000001 HC OB SEMI-PRIVATE ROOM DAILY

## 2024-08-22 PROCEDURE — 86901 BLOOD TYPING SEROLOGIC RH(D): CPT | Performed by: ADVANCED PRACTICE MIDWIFE

## 2024-08-22 PROCEDURE — 59400 OBSTETRICAL CARE: CPT

## 2024-08-22 PROCEDURE — 2500000001 HC RX 250 WO HCPCS SELF ADMINISTERED DRUGS (ALT 637 FOR MEDICARE OP)

## 2024-08-22 PROCEDURE — 7210000002 HC LABOR PER HOUR

## 2024-08-22 PROCEDURE — 51701 INSERT BLADDER CATHETER: CPT

## 2024-08-22 PROCEDURE — 59050 FETAL MONITOR W/REPORT: CPT

## 2024-08-22 RX ORDER — TRANEXAMIC ACID 100 MG/ML
1000 INJECTION, SOLUTION INTRAVENOUS ONCE AS NEEDED
Status: DISCONTINUED | OUTPATIENT
Start: 2024-08-22 | End: 2024-08-24 | Stop reason: HOSPADM

## 2024-08-22 RX ORDER — DIPHENHYDRAMINE HYDROCHLORIDE 50 MG/ML
25 INJECTION INTRAMUSCULAR; INTRAVENOUS EVERY 6 HOURS PRN
Status: DISCONTINUED | OUTPATIENT
Start: 2024-08-22 | End: 2024-08-24 | Stop reason: HOSPADM

## 2024-08-22 RX ORDER — DIPHENHYDRAMINE HCL 25 MG
25 TABLET ORAL EVERY 6 HOURS PRN
Status: DISCONTINUED | OUTPATIENT
Start: 2024-08-22 | End: 2024-08-24 | Stop reason: HOSPADM

## 2024-08-22 RX ORDER — OXYTOCIN 10 [USP'U]/ML
10 INJECTION, SOLUTION INTRAMUSCULAR; INTRAVENOUS ONCE AS NEEDED
Status: DISCONTINUED | OUTPATIENT
Start: 2024-08-22 | End: 2024-08-22 | Stop reason: HOSPADM

## 2024-08-22 RX ORDER — ACETAMINOPHEN 325 MG/1
975 TABLET ORAL EVERY 6 HOURS
Status: DISCONTINUED | OUTPATIENT
Start: 2024-08-22 | End: 2024-08-24 | Stop reason: HOSPADM

## 2024-08-22 RX ORDER — OXYTOCIN/0.9 % SODIUM CHLORIDE 30/500 ML
60 PLASTIC BAG, INJECTION (ML) INTRAVENOUS
Status: DISCONTINUED | OUTPATIENT
Start: 2024-08-22 | End: 2024-08-22 | Stop reason: HOSPADM

## 2024-08-22 RX ORDER — ONDANSETRON HYDROCHLORIDE 2 MG/ML
4 INJECTION, SOLUTION INTRAVENOUS EVERY 6 HOURS PRN
Status: DISCONTINUED | OUTPATIENT
Start: 2024-08-22 | End: 2024-08-24 | Stop reason: HOSPADM

## 2024-08-22 RX ORDER — MISOPROSTOL 200 UG/1
800 TABLET ORAL ONCE AS NEEDED
Status: DISCONTINUED | OUTPATIENT
Start: 2024-08-22 | End: 2024-08-24 | Stop reason: HOSPADM

## 2024-08-22 RX ORDER — ONDANSETRON 4 MG/1
4 TABLET, FILM COATED ORAL EVERY 6 HOURS PRN
Status: DISCONTINUED | OUTPATIENT
Start: 2024-08-22 | End: 2024-08-24 | Stop reason: HOSPADM

## 2024-08-22 RX ORDER — IBUPROFEN 600 MG/1
600 TABLET ORAL EVERY 6 HOURS
Status: DISCONTINUED | OUTPATIENT
Start: 2024-08-22 | End: 2024-08-24 | Stop reason: HOSPADM

## 2024-08-22 RX ORDER — MINERAL OIL
OIL (ML) ORAL
Status: DISPENSED
Start: 2024-08-22 | End: 2024-08-22

## 2024-08-22 RX ORDER — LIDOCAINE 560 MG/1
1 PATCH PERCUTANEOUS; TOPICAL; TRANSDERMAL
Status: DISCONTINUED | OUTPATIENT
Start: 2024-08-22 | End: 2024-08-24 | Stop reason: HOSPADM

## 2024-08-22 RX ORDER — ADHESIVE BANDAGE
10 BANDAGE TOPICAL
Status: DISCONTINUED | OUTPATIENT
Start: 2024-08-22 | End: 2024-08-24 | Stop reason: HOSPADM

## 2024-08-22 RX ORDER — BISACODYL 10 MG/1
10 SUPPOSITORY RECTAL DAILY PRN
Status: DISCONTINUED | OUTPATIENT
Start: 2024-08-22 | End: 2024-08-24 | Stop reason: HOSPADM

## 2024-08-22 RX ORDER — NIFEDIPINE 10 MG/1
10 CAPSULE ORAL ONCE AS NEEDED
Status: DISCONTINUED | OUTPATIENT
Start: 2024-08-22 | End: 2024-08-22 | Stop reason: HOSPADM

## 2024-08-22 RX ORDER — OXYTOCIN 10 [USP'U]/ML
10 INJECTION, SOLUTION INTRAMUSCULAR; INTRAVENOUS ONCE AS NEEDED
Status: DISCONTINUED | OUTPATIENT
Start: 2024-08-22 | End: 2024-08-22

## 2024-08-22 RX ORDER — HYDRALAZINE HYDROCHLORIDE 20 MG/ML
5 INJECTION INTRAMUSCULAR; INTRAVENOUS ONCE AS NEEDED
Status: DISCONTINUED | OUTPATIENT
Start: 2024-08-22 | End: 2024-08-22 | Stop reason: HOSPADM

## 2024-08-22 RX ORDER — TRANEXAMIC ACID 100 MG/ML
1000 INJECTION, SOLUTION INTRAVENOUS ONCE AS NEEDED
Status: DISCONTINUED | OUTPATIENT
Start: 2024-08-22 | End: 2024-08-22 | Stop reason: HOSPADM

## 2024-08-22 RX ORDER — HYDRALAZINE HYDROCHLORIDE 20 MG/ML
5 INJECTION INTRAMUSCULAR; INTRAVENOUS ONCE AS NEEDED
Status: DISCONTINUED | OUTPATIENT
Start: 2024-08-22 | End: 2024-08-24 | Stop reason: HOSPADM

## 2024-08-22 RX ORDER — METHYLERGONOVINE MALEATE 0.2 MG/ML
0.2 INJECTION INTRAVENOUS ONCE AS NEEDED
Status: DISCONTINUED | OUTPATIENT
Start: 2024-08-22 | End: 2024-08-22 | Stop reason: HOSPADM

## 2024-08-22 RX ORDER — LABETALOL HYDROCHLORIDE 5 MG/ML
20 INJECTION, SOLUTION INTRAVENOUS ONCE AS NEEDED
Status: DISCONTINUED | OUTPATIENT
Start: 2024-08-22 | End: 2024-08-22 | Stop reason: HOSPADM

## 2024-08-22 RX ORDER — METHYLERGONOVINE MALEATE 0.2 MG/ML
0.2 INJECTION INTRAVENOUS ONCE AS NEEDED
Status: DISCONTINUED | OUTPATIENT
Start: 2024-08-22 | End: 2024-08-24 | Stop reason: HOSPADM

## 2024-08-22 RX ORDER — LOPERAMIDE HYDROCHLORIDE 2 MG/1
4 CAPSULE ORAL EVERY 2 HOUR PRN
Status: DISCONTINUED | OUTPATIENT
Start: 2024-08-22 | End: 2024-08-24 | Stop reason: HOSPADM

## 2024-08-22 RX ORDER — OXYTOCIN 10 [USP'U]/ML
10 INJECTION, SOLUTION INTRAMUSCULAR; INTRAVENOUS ONCE AS NEEDED
Status: DISCONTINUED | OUTPATIENT
Start: 2024-08-22 | End: 2024-08-24 | Stop reason: HOSPADM

## 2024-08-22 RX ORDER — SODIUM CHLORIDE, SODIUM LACTATE, POTASSIUM CHLORIDE, CALCIUM CHLORIDE 600; 310; 30; 20 MG/100ML; MG/100ML; MG/100ML; MG/100ML
125 INJECTION, SOLUTION INTRAVENOUS CONTINUOUS
Status: DISCONTINUED | OUTPATIENT
Start: 2024-08-22 | End: 2024-08-24 | Stop reason: HOSPADM

## 2024-08-22 RX ORDER — METOCLOPRAMIDE 10 MG/1
10 TABLET ORAL EVERY 6 HOURS PRN
Status: DISCONTINUED | OUTPATIENT
Start: 2024-08-22 | End: 2024-08-24 | Stop reason: HOSPADM

## 2024-08-22 RX ORDER — MISOPROSTOL 200 UG/1
800 TABLET ORAL ONCE AS NEEDED
Status: DISCONTINUED | OUTPATIENT
Start: 2024-08-22 | End: 2024-08-22 | Stop reason: HOSPADM

## 2024-08-22 RX ORDER — CARBOPROST TROMETHAMINE 250 UG/ML
250 INJECTION, SOLUTION INTRAMUSCULAR ONCE AS NEEDED
Status: DISCONTINUED | OUTPATIENT
Start: 2024-08-22 | End: 2024-08-24 | Stop reason: HOSPADM

## 2024-08-22 RX ORDER — LIDOCAINE HYDROCHLORIDE 10 MG/ML
30 INJECTION INFILTRATION; PERINEURAL ONCE AS NEEDED
Status: DISCONTINUED | OUTPATIENT
Start: 2024-08-22 | End: 2024-08-24 | Stop reason: HOSPADM

## 2024-08-22 RX ORDER — LOPERAMIDE HYDROCHLORIDE 2 MG/1
4 CAPSULE ORAL EVERY 2 HOUR PRN
Status: DISCONTINUED | OUTPATIENT
Start: 2024-08-22 | End: 2024-08-22 | Stop reason: HOSPADM

## 2024-08-22 RX ORDER — OXYTOCIN/0.9 % SODIUM CHLORIDE 30/500 ML
60 PLASTIC BAG, INJECTION (ML) INTRAVENOUS ONCE AS NEEDED
Status: DISCONTINUED | OUTPATIENT
Start: 2024-08-22 | End: 2024-08-24 | Stop reason: HOSPADM

## 2024-08-22 RX ORDER — TERBUTALINE SULFATE 1 MG/ML
0.25 INJECTION SUBCUTANEOUS ONCE AS NEEDED
Status: DISCONTINUED | OUTPATIENT
Start: 2024-08-22 | End: 2024-08-22 | Stop reason: HOSPADM

## 2024-08-22 RX ORDER — LEVOTHYROXINE SODIUM 50 UG/1
25 TABLET ORAL DAILY
Status: DISCONTINUED | OUTPATIENT
Start: 2024-08-22 | End: 2024-08-24 | Stop reason: HOSPADM

## 2024-08-22 RX ORDER — NIFEDIPINE 10 MG/1
10 CAPSULE ORAL ONCE AS NEEDED
Status: DISCONTINUED | OUTPATIENT
Start: 2024-08-22 | End: 2024-08-24 | Stop reason: HOSPADM

## 2024-08-22 RX ORDER — POLYETHYLENE GLYCOL 3350 17 G/17G
17 POWDER, FOR SOLUTION ORAL 2 TIMES DAILY PRN
Status: DISCONTINUED | OUTPATIENT
Start: 2024-08-22 | End: 2024-08-24 | Stop reason: HOSPADM

## 2024-08-22 RX ORDER — METOCLOPRAMIDE HYDROCHLORIDE 5 MG/ML
10 INJECTION INTRAMUSCULAR; INTRAVENOUS EVERY 6 HOURS PRN
Status: DISCONTINUED | OUTPATIENT
Start: 2024-08-22 | End: 2024-08-24 | Stop reason: HOSPADM

## 2024-08-22 RX ORDER — CARBOPROST TROMETHAMINE 250 UG/ML
250 INJECTION, SOLUTION INTRAMUSCULAR ONCE AS NEEDED
Status: DISCONTINUED | OUTPATIENT
Start: 2024-08-22 | End: 2024-08-22 | Stop reason: HOSPADM

## 2024-08-22 RX ORDER — SIMETHICONE 80 MG
80 TABLET,CHEWABLE ORAL 4 TIMES DAILY PRN
Status: DISCONTINUED | OUTPATIENT
Start: 2024-08-22 | End: 2024-08-24 | Stop reason: HOSPADM

## 2024-08-22 RX ORDER — LABETALOL HYDROCHLORIDE 5 MG/ML
20 INJECTION, SOLUTION INTRAVENOUS ONCE AS NEEDED
Status: DISCONTINUED | OUTPATIENT
Start: 2024-08-22 | End: 2024-08-24 | Stop reason: HOSPADM

## 2024-08-22 ASSESSMENT — PAIN SCALES - GENERAL
PAINLEVEL_OUTOF10: 1
PAINLEVEL_OUTOF10: 1
PAINLEVEL_OUTOF10: 10 - WORST POSSIBLE PAIN
PAINLEVEL_OUTOF10: 2
PAINLEVEL_OUTOF10: 3
PAINLEVEL_OUTOF10: 10 - WORST POSSIBLE PAIN
PAINLEVEL_OUTOF10: 1
PAINLEVEL_OUTOF10: 10 - WORST POSSIBLE PAIN
PAINLEVEL_OUTOF10: 2
PAINLEVEL_OUTOF10: 1
PAINLEVEL_OUTOF10: 3

## 2024-08-22 ASSESSMENT — PAIN DESCRIPTION - LOCATION: LOCATION: ABDOMEN

## 2024-08-22 ASSESSMENT — PAIN DESCRIPTION - DESCRIPTORS
DESCRIPTORS: SORE
DESCRIPTORS: CRAMPING
DESCRIPTORS: SORE
DESCRIPTORS: SPASM

## 2024-08-22 ASSESSMENT — PAIN - FUNCTIONAL ASSESSMENT: PAIN_FUNCTIONAL_ASSESSMENT: 0-10

## 2024-08-22 NOTE — LACTATION NOTE
Lactation Consultant Note  Lactation Consultation  Reason for Consult: Initial assessment  Consultant Name: Mary Dumont    Maternal Information  Has mother  before?: No  Infant to breast within first 2 hours of birth?: Yes  Exclusive Pump and Bottle Feed: No  WIC Program: No    Maternal Assessment  Breast Assessment: Medium, Soft  Nipple Assessment: Intact, Erect  Areola Assessment: Normal    Infant Assessment  Infant Behavior: Awake, Gaggy/spitty, Rooting response, Suckles on and off, needs stimulation  Infant Assessment: Tongue protrudes over alveolar ridge, Good cupping of tongue    Feeding Assessment  Nutrition Source: Breastmilk  Feeding Method: Nursing at the breast  Feeding Position: Cradle, Skin to skin, Breast sandwich, Mother demonstrates good positioning  Suck/Feeding: Sustained, Baby led rhythmically, Audible swallowing, Unsustained  Latch Assessment: Minimal assistance is needed, Instructed on deep latch, Latch achieved, Wide open mouth < 160, Comfortable with no pain, Bursts of sucking, swallowing, and rest, Other (Comment) (Popping on and off, intermittent grunting)    LATCH TOOL  Latch: Grasps breast, tongue down, lips flanged, rhythmic sucking  Audible Swallowing: Spontaneous and intermittent (24 hours old)  Type of Nipple: Everted (After stimulation)  Comfort (Breast/Nipple): Soft/non-tender  Hold (Positioning): Minimal assist, teach one side, mother does other, staff holds  LATCH Score: 9    Breast Pump  Pump: None    Other OB Lactation Tools       Patient Follow-up  Inpatient Lactation Follow-up Needed : Yes  Outpatient Lactation Follow-up: Recommended    Other OB Lactation Documentation  Maternal Risk Factors: Hypothyroidism, Other (comment) (Hx. Anxiety/depression)    Recommendations/Summary  , 40.3 weeks, NCB on @1008. Birthweight 3480g. Subclinical hypothyroidism. Parents report infant grunting during recovery, latched after repeated attempts with assistance of RN. Infant due  for feeding, parents/Rns report infant has been gaggy/spitty. Strong suck noted on gloved finger, lingual frenulum not palpable. Some tachypnea, calms easily.  Taught ABC's of good positioning: arms around breast, infant belly to belly with parent, infant's curve of hip to parent's curve of body. Taught to have infant rest their chin on the breast below the areola. Parents instructed to wait for gape reflex elicited by chin pressure on the breast, before hugging infant close to facilitate latching. Parents demonstrate technique with minimal assistance from IBCLC to time latching. Infant able to latch deeply with wide gape and sustained rhythmical sucking. Popping on and off to breathe intermittently. Sustained for 3-5min, then came off breast, retracting/grunting. RN notified, stopped feeding at this time for assessment of infant.    Educated parents on skin to skin, hand expression, hunger cues and feeding frequency/patterns. Discussed expected output, weight loss <10%, and normal bilirubin. Educated on AAP pacifier recommendations. Reviewed outpatient resources.

## 2024-08-22 NOTE — H&P
Obstetrical Admission History and Physical     Anne Wu is a 29 y.o.  at 40w3d. JESSICA: 2024, by Ultrasound. Estimated fetal weight: 8#. She has had prenatal care with [facility] .    Chief Complaint: No chief complaint on file.    Assessment/Plan    IUP 40w3d--active labor  Reassuring fetal status  Anticipate second stage and   Dr Gill aware of admit and status  Al patient questions answered    Assessment & Plan  Labor without complication ([facility])        Pregnancy Problems (from 24 to present)       Problem Noted Resolved    Subclinical hypothyroidism 2024 by ARELIS Gautam No    Priority:  Medium      Overview Signed 2024  8:54 AM by ARELIS Gautam     Under care of Dr. Chakraborty. Previously dx as hashimoto's, though her endocrinology not hashimoto's.         Lump of right breast 3/13/2024 by ARELIS Gautam No    Priority:  Medium      Overview Addendum 2024  9:15 AM by ARELIS Gautam     Cat 3 breast US- likely fibroadenoma. Repeat breast US scheduled rec in 6 mo and order placed.          Depression affecting pregnancy ([facility]) 3/13/2024 by ARELIS Gautam No    Priority:  Medium      Overview Addendum 2024  9:31 AM by ARELIS Gautam     3/13/24, EPDS 14. EPDS 24, 6.  Patient is under care of a counselor at [facility].          Encounter for supervision of normal first pregnancy in first trimester ([facility]) 2024 by ARELIS Gautam No    Priority:  Medium      Overview Addendum 2024  9:30 AM by ARELIS Gautam     rr NIPS  Subclinical hypothyroidism  Had Covid vaccine, declines booster  Depression, unmedicated- stopped sertraline with pregnancy         Hashimoto's thyroiditis 3/19/2024 by ARELIS Gautam 2024 by ARELIS Gautam    Priority:  Medium      Overview Addendum 3/19/2024  2:47 PM by Madiha  ROGER Hopkins, SYDNEY-SYDNEE     Endocrinology apt , last tsh : 1.43               Options for delivery have been discussed with the patient and she elects a vaginal delivery.  Labor has been discussed in detail. The risks, benefits, complications, alternatives, expected outcomes, potential problems during recuperation and recovery, and the risks of not performing the procedure were discussed with the patient. The patient stated understanding that the risks of delivery include, but are not limited to: death; reaction to medications; injury to bowel, bladder, ureters, uterus, cervix, vagina, and other pelvic and abdominal structures, infection; blood loss and possible need for transfusion; and potential need for surgery, including hysterectomy. The risks of injury to the infant during delivery were also discussed. All questions were answered. There was concurrence with the planned procedure, and the patient wanted to proceed.    Admit to inpatient status. I anticipate that this patient will require a stay exceeding at least 2 midnights for delivery and postpartum care.  Active management of labor.  Management of pregnancy complications, as indicated.    Subjective   Anne is here complaining of q3-5 min contractions and SROM at in the car on the way [place].m. today of clear fluid. Good fetal movement. C/O bloody show., Having contractions q 3-5' minutes.       She is planning unmedicated birth     Obstetrical History   OB History    Para Term  AB Living   2             SAB IAB Ectopic Multiple Live Births                  # Outcome Date GA Lbr Josse/2nd Weight Sex Type Anes PTL Lv   2 Current            1                 Past Medical History  No past medical history on file.     Past Surgical History   No past surgical history on file.    Social History  Social History     Tobacco Use    Smoking status: Never    Smokeless tobacco: Never   Substance Use Topics    Alcohol use: Never     Substance  and Sexual Activity   Drug Use Never       Allergies  Patient has no known allergies.     Medications  Medications Prior to Admission   Medication Sig Dispense Refill Last Dose    levothyroxine (Synthroid, Levoxyl) 25 mcg tablet Take 1 tablet (25 mcg) by mouth once daily. 90 tablet 3 8/21/2024       Objective    Last Vitals  Temp Pulse Resp BP MAP O2 Sat                   Physical Examination  GENERAL: Examination reveals a well developed, well nourished, gravid female in no acute distress. She is alert and cooperative.  HEENT: PERRLA. External ears normal. Nose normal, no erythema or discharge. Mouth and throat clear.  NECK: supple, no significant adenopathy  LUNGS: clear to auscultation bilaterally  HEART: regular rate and rhythm, S1, S2 normal, no murmur, click, rub or gallop  BREASTS: breasts appear normal for pregnancy, no suspicious masses, no skin or nipple changes or axillary nodes  The fetus is in a vertex presentation, determined by vaginal exam  Current Estimated Fetal Weight 8# established by Leopold's maneuver  VAGINA: normal appearing vagina with normal color and discharge and no lesions noted  CERVIX:  8/100/0 ; MEMBRANES are SROM--clear in the car on the way [facility]     Lab Review  Admit labs pending

## 2024-08-22 NOTE — PROGRESS NOTES
Intrapartum Progress Note  Subjective   Pt resting in bed, support person at bedside. Feeling pushy with contractions but breathing through contractions with support and frequent position changes. Agreeable to exam at this time.     Principal Problem:    Labor without complication ([facility])      Objective   Last Vitals:  Temp Pulse Resp BP MAP Pulse Ox   36.9 °C (98.4 °F) 56 16 121/57   99 %     Vitals Min/Max Last 24 Hours:  Temp  Min: 36.9 °C (98.4 °F)  Max: 36.9 °C (98.4 °F)  Pulse  Min: 48  Max: 133  Resp  Min: 16  Max: 18  BP  Min: 112/58  Max: 128/62    Physical Examination:  GENERAL: Examination reveals a well developed, well nourished, gravid female in no acute distress and whose affect is appropriate. She is alert and cooperative.  HEENT: PERRLA. External ears normal. Nose normal, no erythema or discharge. Mouth and throat clear.  NECK: supple, no significant adenopathy  LUNGS:  normal resp effort   ABDOMEN: soft, gravid, nontender, nondistended, no abnormal masses, no epigastric pain, fundus soft, nontender, size equal dates, FHT present  VAGINA: normal appearing vagina with normal color and discharge and no lesions noted  CERVIX: Lip/rim (Comment) cm dilated, 100 % effaced, 0 station; MEMBRANES are SROM  EXTREMITIES: no redness or tenderness in the calves or thighs, no edema, no limitation in range of motion  SKIN: normal coloration and turgor, no rashes  NEUROLOGICAL: alert, oriented, normal speech, no focal findings or movement disorder noted  PSYCHOLOGICAL: awake and alert; oriented to person, place, and time    Fetal Monitoring      Baseline FHR: 125 per minute  Variability: moderate  Accelerations: yes  Decelerations: variable - intermittent   TOCO: regular, every 2-3 minutes    Lab Review:  Labs in chart were reviewed.    Assessment   Anne Wu is a 29 y.o.  at 40w3d. JESSICA: 2024, by Ultrasound.   FHT Category 2  - overall reassuring   Active labor  GBS neg     Plan   Expectant  management  Encourage frequent position changes as tolerated  Encourage ambulation as tolerated  Maternal repositioning  Pain management per patient request  Continue assessment of maternal and fetal wellbeing  Recheck as clinically indicated by maternal or fetal status  Anticipate second stage of labor  Anticipate NSVB    Lizzy Sherman, SYDNEY-CNCYNTHIA

## 2024-08-22 NOTE — CARE PLAN
The patient's goals for the shift include Have a baby    The clinical goals for the shift include Reassuring FHR    Over the shift, the patient did not make progress toward the following goals.       Problem: Postpartum  Goal: Experiences normal postpartum course  Outcome: Progressing  Goal: Appropriate maternal -  bonding  Outcome: Progressing     Problem: Pain - Adult  Goal: Verbalizes/displays adequate comfort level or baseline comfort level  Outcome: Progressing     Problem: Safety - Adult  Goal: Free from fall injury  Outcome: Progressing     Problem: Discharge Planning  Goal: Discharge to home or other facility with appropriate resources  Outcome: Progressing     Problem: Vaginal Birth or  Section  Goal: Fetal and maternal status remain reassuring during the birth process  Outcome: Met

## 2024-08-22 NOTE — L&D DELIVERY NOTE
OB Delivery Note  2024  Anne Wu  29 y.o.   Vaginal, Spontaneous       Gestational Age: 40w3d  /Para:   Quantitative Blood Loss: Admission to Discharge: 150 mL (2024  6:40 AM - 2024 11:38 AM)    Viviana Wu [[id number]]      Labor Events    Sac identifier: Sac 1  Rupture date/time: 2024 0630  Rupture type: Spontaneous  Fluid color: Clear  Fluid odor: None  Labor type: Spontaneous Onset of Labor  Labor allowed to proceed with plans for an attempted vaginal birth?: Yes  Augmentation: None  Complications: None       Labor Event Times    Labor onset date/time: 2024 0100  Dilation complete date/time: 2024  Start pushing date/time: 2024       Placenta    Placenta delivery date/time: 2024 1015  Placenta removal: Spontaneous  Placenta appearance: Intact  Placenta disposition: discarded       Cord    Vessels: 3 vessels  Complications: Nuchal  Nuchal intervention: reduced  Nuchal cord description: loose nuchal cord  Number of loops: 1  Delayed cord clamping?: Yes  Cord clamped date/time: 2024 10:11:00  Cord blood disposition: Lab  Gases sent?: Yes       Lacerations    Perineal laceration: 2nd  Perineal laceration repaired?: Yes  Labial laceration?: Yes  Labial laceration location: bilateral  Labial laceration repaired?: No  Repair suture: 3-0 Synthetic Suture       Anesthesia    Method: None       Operative Delivery    Forceps attempted?: No  Vacuum extractor attempted?: No       Shoulder Dystocia    Shoulder dystocia present?: No       [place] Delivery    Time head delivered: 2024 10:09:00  Birth date/time: 2024 10:08:00  Delivery type: Vaginal, Spontaneous  Complications: None       Resuscitation    Method: Suctioning, Tactile stimulation       Apgars    Living status: Living  Apgar Component Scores:  1 min.:  5 min.:  10 min.:  15 min.:  20 min.:    Skin color:  1  1       Heart rate:  2  2       Reflex irritability:  2  2       Muscle tone:   1  2       Respiratory effort:  2  2       Total:  8  9       Apgars assigned by: GALILEO BOUCHER       Delivery Providers    Delivering clinician: ARELIS Westfall   Provider Role    Mis Lovett RN Delivery Nurse    Galileo Boucher, RN Nursery Nurse     Resident                 Intrauterine Device Inserted : Jackie,      ARELIS Westfall

## 2024-08-23 PROCEDURE — 2500000001 HC RX 250 WO HCPCS SELF ADMINISTERED DRUGS (ALT 637 FOR MEDICARE OP): Performed by: ADVANCED PRACTICE MIDWIFE

## 2024-08-23 PROCEDURE — 2500000001 HC RX 250 WO HCPCS SELF ADMINISTERED DRUGS (ALT 637 FOR MEDICARE OP)

## 2024-08-23 PROCEDURE — 1220000001 HC OB SEMI-PRIVATE ROOM DAILY

## 2024-08-23 RX ORDER — ACETAMINOPHEN 500 MG
1000 TABLET ORAL EVERY 6 HOURS PRN
COMMUNITY
Start: 2024-08-23

## 2024-08-23 RX ORDER — IBUPROFEN 600 MG/1
600 TABLET ORAL EVERY 6 HOURS PRN
Qty: 120 TABLET | Refills: 0 | Status: SHIPPED | OUTPATIENT
Start: 2024-08-23

## 2024-08-23 RX ORDER — DOCUSATE SODIUM 100 MG/1
100 CAPSULE, LIQUID FILLED ORAL 2 TIMES DAILY PRN
COMMUNITY
Start: 2024-08-23

## 2024-08-23 ASSESSMENT — PAIN SCALES - GENERAL
PAINLEVEL_OUTOF10: 2

## 2024-08-23 ASSESSMENT — PAIN DESCRIPTION - DESCRIPTORS
DESCRIPTORS: CRAMPING

## 2024-08-23 NOTE — CARE PLAN
The patient's goals for the shift include bone with baby    The clinical goals for the shift include return to prepregnant state      Problem: Postpartum  Goal: Experiences normal postpartum course  Outcome: Progressing  Flowsheets (Taken 2024)  Experiences normal postpartum course:   Monitor maternal vital signs   Assess uterine involution   Med administration/monitoring of effect   Fundal and lochia asssessments w/fundal massage, bladder emptying  Goal: Appropriate maternal -  bonding  Outcome: Progressing  Flowsheets (Taken 2024)  Appropriate maternal- bonding: Identify family support     Problem: Pain - Adult  Goal: Verbalizes/displays adequate comfort level or baseline comfort level  Outcome: Progressing  Flowsheets (Taken 2024)  Verbalizes/displays adequate comfort level or baseline comfort level:   Encourage patient to monitor pain and request assistance   Assess pain using appropriate pain scale     Problem: Safety - Adult  Goal: Free from fall injury  Outcome: Progressing  Flowsheets (Taken 2024)  Free from fall injury: Instruct family/caregiver on patient safety     Problem: Discharge Planning  Goal: Discharge to home or other facility with appropriate resources  Outcome: Progressing

## 2024-08-23 NOTE — PROGRESS NOTES
Postpartum Progress Note    Assessment/Plan   Anne Wu is a 29 y.o., , who delivered at 40w3d gestation and is now postpartum day 1. s/p normal vaginal delivery  Assessment & Plan  Vaginal delivery ([facility])  Planning on discharge home tomorrow  Continue working with lactation to improve latch    Pregnancy Problems (from 24 to present)       Problem Noted Resolved    Subclinical hypothyroidism 2024 by ARELIS Gautam No    Priority:  Medium      Overview Signed 2024  8:54 AM by ARELIS Gautam     Under care of Dr. Chakraborty. Previously dx as hashimoto's, though her endocrinology not hashimoto's.         Lump of right breast 3/13/2024 by ARELIS Gautam No    Priority:  Medium      Overview Addendum 2024  9:15 AM by ARELIS Gautam     Cat 3 breast US- likely fibroadenoma. Repeat breast US scheduled rec in 6 mo and order placed.          Depression affecting pregnancy ([facility]) 3/13/2024 by ARELIS Gautam No    Priority:  Medium      Overview Addendum 2024  9:31 AM by ARELIS Gautam     3/13/24, EPDS 14. EPDS 24, 6.  Patient is under care of a counselor at [facility].          Encounter for supervision of normal first pregnancy in first trimester ([facility]) 2024 by ARELIS Gautam No    Priority:  Medium      Overview Addendum 2024  9:30 AM by ARELIS Gautam     rr NIPS  Subclinical hypothyroidism  Had Covid vaccine, declines booster  Depression, unmedicated- stopped sertraline with pregnancy         Hashimoto's thyroiditis 3/19/2024 by ARELIS Gautam 2024 by ARELIS Gautam    Priority:  Medium      Overview Addendum 3/19/2024  2:47 PM by ARELIS Gautam     Endocrinology apt , last tsh : 1.43               Hospital course: no complications  Vaginal Birth  Patient is currently breastfeeding  The patient's  blood type is B POS.  Rhogam is not indicated.    Subjective   Her pain is well controlled with current medications  She is passing flatus  She is ambulating well  She is tolerating a Adult diet Regular  She reports poor infant latch. Working with nursing and lactation to improve latch. Once baby latches, he stays on and nurses well.   She denies emotional concerns today       Objective   Allergies:   Patient has no known allergies.         Last Vitals:  Temp Pulse Resp BP MAP Pulse Ox   36.7 °C (98 °F) 70 16 99/61 72 96 %     Vitals Min/Max Last 24 Hours:  Temp  Min: 36.7 °C (98 °F)  Max: 37 °C (98.6 °F)  Pulse  Min: 53  Max: 78  Resp  Min: 16  Max: 18  BP  Min: 99/61  Max: 135/64  MAP (mmHg)  Min: 72  Max: 92    Intake/Output:     Intake/Output Summary (Last 24 hours) at 8/23/2024 1034  Last data filed at 8/22/2024 1600  Gross per 24 hour   Intake --   Output 715 ml   Net -715 ml       Physical Exam:  General: Examination reveals a well developed, well nourished, female, in no acute distress. She is alert and cooperative.  HEENT: External ears normal. Nose normal, no erythema or discharge. Mouth and throat clear.  Lungs: normal effort.  Breasts: nipple enlargement with tenderness.  Fundus: firm.  Extremities: no redness or tenderness in the calves or thighs.  Psychological: awake and alert; oriented to person, place, and time.    Lab Data:  Lab Results   Component Value Date    ABO B 08/22/2024    LABRH POS 08/22/2024    ABSCRN NEG 08/22/2024     Lab Results   Component Value Date    WBC 16.1 (H) 08/22/2024    HGB 11.7 (L) 08/22/2024    HCT 36.8 08/22/2024     08/22/2024     0   Lab Value Date/Time    GRPBSTREP No Group B Streptococcus (GBS) isolated 07/24/2024 0957

## 2024-08-23 NOTE — CARE PLAN
The patient's goals for the shift include Have a baby    The clinical goals for the shift include bond with baby, get rest      Problem: Postpartum  Goal: Experiences normal postpartum course  Outcome: Progressing  Goal: Appropriate maternal -  bonding  Outcome: Progressing     Problem: Pain - Adult  Goal: Verbalizes/displays adequate comfort level or baseline comfort level  Outcome: Progressing     Problem: Safety - Adult  Goal: Free from fall injury  Outcome: Progressing     Problem: Discharge Planning  Goal: Discharge to home or other facility with appropriate resources  Outcome: Progressing

## 2024-08-24 VITALS
DIASTOLIC BLOOD PRESSURE: 66 MMHG | HEART RATE: 58 BPM | RESPIRATION RATE: 17 BRPM | SYSTOLIC BLOOD PRESSURE: 111 MMHG | OXYGEN SATURATION: 97 % | TEMPERATURE: 98.1 F

## 2024-08-24 PROCEDURE — 2500000001 HC RX 250 WO HCPCS SELF ADMINISTERED DRUGS (ALT 637 FOR MEDICARE OP): Performed by: ADVANCED PRACTICE MIDWIFE

## 2024-08-24 PROCEDURE — 2500000004 HC RX 250 GENERAL PHARMACY W/ HCPCS (ALT 636 FOR OP/ED)

## 2024-08-24 PROCEDURE — 2500000001 HC RX 250 WO HCPCS SELF ADMINISTERED DRUGS (ALT 637 FOR MEDICARE OP)

## 2024-08-24 ASSESSMENT — PAIN SCALES - GENERAL
PAINLEVEL_OUTOF10: 2
PAINLEVEL_OUTOF10: 0 - NO PAIN

## 2024-08-24 ASSESSMENT — PAIN DESCRIPTION - DESCRIPTORS: DESCRIPTORS: ACHING

## 2024-08-24 NOTE — NURSING NOTE
1315  This RN educates pt and family about delivery recovery and postpartum for mom and baby. Education topics include: Post birth warning signs, other signs of infections, clogged milk ducts, mastitis, regularly feeding baby, post partum bleeding, pelvic rest, activity, diet, and mental health/postpartum blues, etc.    Education topics for baby include: signs to call the pediatrician for, signs for calling 9-1-1, cord care, sponge baths, diaper counts, what diapers should look like, dressing baby regarding the temperature,  appts and medications, breastfeeding frequencies, lactation resources, etc.    Bands verified for discharge.

## 2024-08-24 NOTE — CARE PLAN
The patient's goals for the shift include patient would like to be discharged    The clinical goals for the shift include collect birth certificate and manage pain to be less then a 3/10

## 2024-08-24 NOTE — DISCHARGE SUMMARY
OB Discharge Summary    Admission Date: 8/22/2024  Discharge Date: 8/24/2024     Discharge Diagnosis  No problems updated.     Hospital Course  Delivery Date: 8/22/2024 10:08 AM  Delivery type: Vaginal, Spontaneous   GA at delivery: 40w3d  Outcome: Living  Anesthesia during delivery: None  Intrapartum complications: None  Feeding method: Breastfeeding Status: Yes     Procedures: none    NSVB 8/22. NCB. Uncomplicated postpartum course.     Last Vitals:  Blood pressure 111/66, pulse 58, temperature 36.7 °C (98.1 °F), temperature source Oral, resp. rate 17, SpO2 97%, currently breastfeeding.     Pertinent Physical Exam At Time of Discharge  See daily progress note    Lab Results   Component Value Date    HGB 11.7 (L) 08/22/2024    HCT 36.8 08/22/2024       Discharge Meds     Your medication list        START taking these medications        Instructions Last Dose Given Next Dose Due   acetaminophen 500 mg tablet  Commonly known as: Tylenol Extra Strength      Take 2 tablets (1,000 mg) by mouth every 6 hours if needed for mild pain (1 - 3).       docusate sodium 100 mg capsule  Commonly known as: Colace      Take 1 capsule (100 mg) by mouth 2 times a day as needed for constipation.       ibuprofen 600 mg tablet      Take 1 tablet (600 mg) by mouth every 6 hours if needed for mild pain (1 - 3) or moderate pain (4 - 6).              CONTINUE taking these medications        Instructions Last Dose Given Next Dose Due   levothyroxine 25 mcg tablet  Commonly known as: Synthroid, Levoxyl      Take 1 tablet (25 mcg) by mouth once daily.                 Where to Get Your Medications        These medications were sent to [id number] - [place], [place] - [address]  [address] [address]      Phone: [phone]   ibuprofen 600 mg tablet       You can get these medications from any pharmacy    You don't need a prescription for these medications  acetaminophen 500 mg tablet  docusate sodium  100 mg capsule          Complications Requiring Follow-Up  None    Test Results Pending At Discharge  Pending Labs       No current pending labs.            Outpatient Follow-Up  In two weeks virtually for a mood assessment and again in 6 weeks  in person for routine postpartum exam.     Future Appointments   Date Time Provider Department Center   10/9/2024 11:30 AM Kenan Neri MD [id number] [place]         SYDNEY Gautam-SYDNEE

## 2024-08-24 NOTE — LACTATION NOTE
Lactation Consultant Note  Lactation Consultation  Reason for Consult: Follow-up assessment  Consultant Name: Keyanna Milton RN,IBCLC    Maternal Information  Has mother  before?: No  Infant to breast within first 2 hours of birth?: Yes  Exclusive Pump and Bottle Feed: No  WIC Program: No    Maternal Assessment  Breast Assessment: Medium, Filling, Compressible  Nipple Assessment: Bruised, Red/inflamed, Sore (Bruising)  Alterations in Nipple Condition: Stage I - pain or irritation with no skin break down  [place] Assessment: Normal    Infant Assessment  Infant Behavior: Light sleep, Readiness to feed, Feeding cues observed, Rooting response, Sucking  Infant Assessment: Sublingual frenulum (comment), Palate - high/arch/bubble/normal, Other (Comment) (Strong suck, NB biting,NB slurping and some clicking, lip tie noted)    Feeding Assessment  Nutrition Source: Breastmilk  Feeding Method: Nursing at the breast  Feeding Position: Cradle, Skin to skin, One side per feeding, Mother demonstrates good positioning, Baby led  Suck/Feeding: Sustained, Clenching/biting (attempted nipple shield and still painful)  Latch Assessment: Minimal assistance is needed, Instructed on deep latch, Deep latch obtained, Bursts of sucking, swallowing, and rest, Pain during feeding, Upper lip turned in, Clenched jaws, Nipple - slurping/pulls/nibbles, Nipple slides back and forth within baby's mouth (NB goes on deep, readjust and slurps/chews back on to nipple, suction is broken and NB continues to go on shallow and with biting chewing, jaw massage, out pt appointment made as well)    LATCH TOOL  Latch: Grasps breast, tongue down, lips flanged, rhythmic sucking  Audible Swallowing: Spontaneous and intermittent (24 hours old)  Type of Nipple: Everted (After stimulation)  Comfort (Breast/Nipple): Filling, red/small blisters/bruises, mild/moderate discomfort  Hold (Positioning): Minimal assist, teach one side, mother does other, staff  "holds  LATCH Score: 8    Breast Pump  Pump:  (Reviewed pumping options if nipple rest is needed. Reviewed pump cleaning, feeding amounts, storage and handling of breastmilk and feediong options for EBM)    Other OB Lactation Tools  Lactation Tools: Nipple shields, Lanolin, Comfort gels (Mother Nippe measured 18mm on right and left)    Patient Follow-up  Inpatient Lactation Follow-up Needed : No  Outpatient Lactation Follow-up: Scheduled  Lactation Professional - OK to Discharge: Yes    Other OB Lactation Documentation  Maternal Risk Factors: Hypothyroidism, Other (comment) (HX of anxiety of expression, subclinical hypothyroidism,lump on right breast 3/13/24)    Recommendations/Summary  RN IBCLC in room at this time to discuss shallow latch, sore nipples and discharge teaching.   Mother is 27yo   on 24 at 1008 of viable boy \"Dunaway\" at 40.3weeks gestation. Mother has hx of subclinical hypothyroidism, anxiety, lumbar disc herniation, lump of right breast 3/13/24, hx of depression and anxiety. Mother would like to EBF. NB has been feeding well, mother reporting shallow latch and pain since cluster feeding began.  LC worked with mother on latching., Mother demonstrates good waking techniques, good positioning, waits for gape reflex and can un latch nb with pinky. NB latches deeply and then repositions and slurps and chews on and is biting. Worked with jaw massage and tried nipple shield, latch is still painful and NB is still biting. Mother able to latch, still painful.  Reviewed feeding plan and discharge planning and out patient appointment made for patient  "

## 2024-08-24 NOTE — PROGRESS NOTES
Postpartum Progress Note    Subjective    Pt doing well. Pain is well controlled with meds. Afebrile. No chills.  Scant/normal lochia. Voiding. Up and walking.  Eating regular diet. No N/V. Passing gas. No BM yet.  Breast feeding with minimal difficulty and being assisted by lactation.  No Complaints    Objective    /66   Pulse 58   Temp 36.7 °C (98.1 °F) (Oral)   Resp 17   LMP  (LMP Unknown)   SpO2 97%   Breastfeeding Yes    General: Examination reveals a well developed, well nourished, female, in no acute distress. She is alert and cooperative.  HEENT: Conjunctiva clear  Lungs: clear to auscultation bilaterally.  Cardiac: regular rate and rhythm  Breasts: lactating, no erythema or tenderness, nipples normal.  Abdomen: Soft, non-distended  Fundus: firm and 1 below umbilicus.  Perineum: well approximated.  Extremities: no redness or tenderness in the calves or thighs, no edema.  Psychological: awake and alert; oriented to person, place, and time.    Lab Results   Component Value Date    HGB 11.7 (L) 08/22/2024    HCT 36.8 08/22/2024       Assessment/Plan    29 y.o. postpartum day 2 s/p NSVB  Active Problems:    Vaginal delivery ([facility])      Plan:  -Continue normal postpartum care  -Patient instructed on pelvic rest x6 weeks  -Post birth warning signs reviewed  -Patient reports that she has adequate help at home and access to infant supplies  -Patient instructed to continue PNV upon D/C for one year or until after she discontinues breastfeeding whichever is longer    -Rhogam if indicated prior to discharge    -Continue to encourage breast feeding  -Lactation consult as needed    DVT prophylaxis as indicated in orders  -SCDs  -Ambulation    Dispo: anticipate discharge on today if continues to meet milestones. Plan for follow up in 2 weeks virtually for mood assessment and again in  4-6 weeks for postpartum visit with OB provider.    ARELIS Gautam

## 2024-08-26 ENCOUNTER — APPOINTMENT (OUTPATIENT)
Dept: RADIOLOGY | Facility: CLINIC | Age: 29
End: 2024-08-26
Payer: COMMERCIAL

## 2024-08-27 ENCOUNTER — APPOINTMENT (OUTPATIENT)
Dept: OBSTETRICS AND GYNECOLOGY | Facility: CLINIC | Age: 29
End: 2024-08-27
Payer: COMMERCIAL

## 2024-09-06 ENCOUNTER — APPOINTMENT (OUTPATIENT)
Dept: OBSTETRICS AND GYNECOLOGY | Facility: CLINIC | Age: 29
End: 2024-09-06
Payer: COMMERCIAL

## 2024-09-06 DIAGNOSIS — N94.9 PAIN OF FEMALE SYMPHYSIS PUBIS: Primary | ICD-10-CM

## 2024-09-06 PROCEDURE — 1036F TOBACCO NON-USER: CPT

## 2024-09-06 PROCEDURE — 99213 OFFICE O/P EST LOW 20 MIN: CPT

## 2024-09-06 ASSESSMENT — EDINBURGH POSTNATAL DEPRESSION SCALE (EPDS)
I HAVE BEEN SO UNHAPPY THAT I HAVE HAD DIFFICULTY SLEEPING: NOT AT ALL
I HAVE BEEN SO UNHAPPY THAT I HAVE BEEN CRYING: ONLY OCCASIONALLY
I HAVE FELT SCARED OR PANICKY FOR NO GOOD REASON: NO, NOT AT ALL
I HAVE BEEN ANXIOUS OR WORRIED FOR NO GOOD REASON: HARDLY EVER
I HAVE BLAMED MYSELF UNNECESSARILY WHEN THINGS WENT WRONG: NOT VERY OFTEN
TOTAL SCORE: 3
I HAVE FELT SAD OR MISERABLE: NO, NOT AT ALL
THE THOUGHT OF HARMING MYSELF HAS OCCURRED TO ME: NEVER
THINGS HAVE BEEN GETTING ON TOP OF ME: NO, I HAVE BEEN COPING AS WELL AS EVER
I HAVE LOOKED FORWARD WITH ENJOYMENT TO THINGS: AS MUCH AS I EVER DID
I HAVE BEEN ABLE TO LAUGH AND SEE THE FUNNY SIDE OF THINGS: AS MUCH AS I ALWAYS COULD

## 2024-09-06 NOTE — PROGRESS NOTES
Virtual or Telephone Consent    An interactive audio and video telecommunication system which permits real time communications between the patient (at the originating site) and provider (at the distant site) was utilized to provide this telehealth service.   Verbal consent was requested and obtained from Annemauri Wu on this date, 24 for a telehealth visit.      Subjective   29 y.o.  presenting for postpartum follow-up at 2 weeks postpartum for mood assessment  Delivery Date:   Type of Delivery: Vaginal, Spontaneous   Intrapartum complications: none  Pregnancy Problems (from 24 to present)       Problem Noted Resolved    Subclinical hypothyroidism 2024 by ARELIS Gautam No    Priority:  Medium      Overview Signed 2024  8:54 AM by ARELIS Gautam     Under care of Dr. Chakraborty. Previously dx as hashimoto's, though her endocrinology not hashimoto's.         Lump of right breast 3/13/2024 by ARELIS Gautam No    Priority:  Medium      Overview Addendum 2024  9:15 AM by ARELIS Gautam     Cat 3 breast US- likely fibroadenoma. Repeat breast US scheduled rec in 6 mo and order placed.          Depression affecting pregnancy ([facility]) 3/13/2024 by ARELIS Gautam No    Priority:  Medium      Overview Addendum 2024  9:31 AM by ARELIS Gautam     3/13/24, EPDS 14. EPDS 24, 6.  Patient is under care of a counselor at [facility] [place].          Encounter for supervision of normal first pregnancy in first trimester ([facility]) 2024 by ARELIS Gautam No    Priority:  Medium      Overview Addendum 2024  9:30 AM by ARELIS Gautam     rr NIPS  Subclinical hypothyroidism  Had Covid vaccine, declines booster  Depression, unmedicated- stopped sertraline with pregnancy         Hashimoto's thyroiditis 3/19/2024 by ARELIS Gautam 2024 by Madiha Hopkins  ARELIS    Priority:  Medium      Overview Addendum 3/19/2024  2:47 PM by ARELIS Gautam     Endocrinology apt , last tsh : 1.43                   PP Recovery:   Pain: controlled  Lacerations: 2nd degree  Perineal discomfort: improving and almost resolved  Lochia: scant  Feeding Method: She is breast feeding exclusively. no breast or nursing problems  Lactation Consult Needed?: No  Birth Trauma: No  Bonding with Baby: yes  Sexual Intimacy: No  Contraceptive Method: None  Depression - Denies s/s depression.    Postpartum Depression: Low Risk  (2024)    [place]  Depression Scale     Last EPDS Total Score: 3     Last EPDS Self Harm Result: Never     Emotional Support - SO  Bowel Symptoms - Negative for abdominal discomfort, blood in stool or black stool, and negative change in bowel habits.  Bladder Symptoms - No dysuria, denies hematuria, urinary frequency, urinary urgency or incontinence.   Menses Since Delivery - Resumed/not?no    Patient patient reports pain at site of symphysis pubis when walking and when going up and down stairs.  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    Physical Exam  Constitutional:       Appearance: Normal appearance.   Eyes:      Conjunctiva/sclera: Conjunctivae normal.   Pulmonary:      Effort: Pulmonary effort is normal.   Neurological:      Mental Status: She is alert.   Psychiatric:         Mood and Affect: Mood normal.          Plan      A/P. 29 y.o. now , s/p , normal recovery course  Symphysis pubis pain: Referral to pelvic floor physical therapy placed  Encouraged patient to continue to monitor for signs or symptoms of  mood and anxiety disorders.  EPDS reassuring today with a value of 3.  Encouraged continued breastfeeding. Patient aware resources are available should she need them.   Warning s/s discussed  All questions answered    Patient to follow-up for 6-week postpartum  inpatient exam.    Madiha Hopkins, APRN-CNM

## 2024-10-02 ENCOUNTER — APPOINTMENT (OUTPATIENT)
Dept: OBSTETRICS AND GYNECOLOGY | Facility: CLINIC | Age: 29
End: 2024-10-02
Payer: COMMERCIAL

## 2024-10-02 VITALS — SYSTOLIC BLOOD PRESSURE: 109 MMHG | WEIGHT: 147.2 LBS | DIASTOLIC BLOOD PRESSURE: 74 MMHG | BODY MASS INDEX: 25.27 KG/M2

## 2024-10-02 PROCEDURE — 0503F POSTPARTUM CARE VISIT: CPT

## 2024-10-02 PROCEDURE — 87624 HPV HI-RISK TYP POOLED RSLT: CPT

## 2024-10-02 ASSESSMENT — EDINBURGH POSTNATAL DEPRESSION SCALE (EPDS)
I HAVE BLAMED MYSELF UNNECESSARILY WHEN THINGS WENT WRONG: NO, NEVER
I HAVE BEEN SO UNHAPPY THAT I HAVE HAD DIFFICULTY SLEEPING: NOT AT ALL
I HAVE FELT SAD OR MISERABLE: NOT VERY OFTEN
I HAVE BEEN ABLE TO LAUGH AND SEE THE FUNNY SIDE OF THINGS: AS MUCH AS I ALWAYS COULD
I HAVE BEEN ANXIOUS OR WORRIED FOR NO GOOD REASON: HARDLY EVER
TOTAL SCORE: 4
THE THOUGHT OF HARMING MYSELF HAS OCCURRED TO ME: NEVER
I HAVE FELT SCARED OR PANICKY FOR NO GOOD REASON: NO, NOT MUCH
I HAVE BEEN SO UNHAPPY THAT I HAVE BEEN CRYING: NO, NEVER
THINGS HAVE BEEN GETTING ON TOP OF ME: NO, MOST OF THE TIME I HAVE COPED QUITE WELL
I HAVE LOOKED FORWARD WITH ENJOYMENT TO THINGS: AS MUCH AS I EVER DID

## 2024-10-02 NOTE — PROGRESS NOTES
Subjective   29 y.o.  presenting for postpartum follow-up   Delivery Date: 2024  Type of Delivery: Vaginal, Spontaneous   Intrapartum complications: none  Pregnancy Problems (from 24 to present)       Problem Noted Resolved    Subclinical hypothyroidism 2024 by ARELIS Gautam No    Priority:  Medium      Overview Signed 2024  8:54 AM by ARELIS Gautam     Under care of Dr. Chakraborty. Previously dx as hashimoto's, though her endocrinology not hashimoto's.         Lump of right breast 3/13/2024 by ARELIS Gautam No    Priority:  Medium      Overview Addendum 2024  9:15 AM by ARELIS Gautam     Cat 3 breast US- likely fibroadenoma. Repeat breast US scheduled rec in 6 mo and order placed.          Depression affecting pregnancy ([facility]) 3/13/2024 by ARELIS Gautam No    Priority:  Medium      Overview Addendum 2024  9:31 AM by ARELIS Gautam     3/13/24, EPDS 14. EPDS 24, 6.  Patient is under care of a counselor at [facility].          Encounter for supervision of normal first pregnancy in first trimester 2024 by ARELIS Gautam No    Priority:  Medium      Overview Addendum 2024  9:30 AM by ARELIS Gautam     rr NIPS  Subclinical hypothyroidism  Had Covid vaccine, declines booster  Depression, unmedicated- stopped sertraline with pregnancy         Hashimoto's thyroiditis 3/19/2024 by ARELIS Gautam 2024 by ARELIS Gautam    Overview Addendum 3/19/2024  2:47 PM by ARELIS Gautam     Endocrinology apt , last tsh : 1.43                   PP Recovery:   Pain: controlled  Lacerations: 2nd degree, bilateral labial tears  Perineal discomfort: none, but left labial laceration is bothersome in certain positions  Lochia: resolved  Feeding Method: She is breast feeding exclusively. no breast or nursing  problems  Lactation Consult Needed?: No  Birth Trauma: No  Bonding with Baby: well with baby boy, Dunaway   Sexual Intimacy: No  Contraceptive Method: None  Depression - Denies s/s depression and is feeling well.    Postpartum Depression: Low Risk  (2024)    [place]  Depression Scale     Last EPDS Total Score: 3     Last EPDS Self Harm Result: Never     Emotional Support - SO and mother  Bowel Symptoms - Negative for abdominal discomfort, blood in stool or black stool, and negative change in bowel habits.  Bladder Symptoms - No dysuria, denies hematuria, urinary frequency, urinary urgency or incontinence. Patient does report decrease sensation for urge to urinate  Menses Since Delivery - Resumed/not? No    Physical Exam  HENT:      Mouth/Throat:      Mouth: Mucous membranes are moist.   Eyes:      Conjunctiva/sclera: Conjunctivae normal.   Neck:      Thyroid: No thyroid mass, thyromegaly or thyroid tenderness.   Cardiovascular:      Rate and Rhythm: Normal rate and regular rhythm.      Pulses: Normal pulses.   Pulmonary:      Effort: Pulmonary effort is normal.      Breath sounds: Normal breath sounds.   Chest:   Breasts:     Breasts are symmetrical.      Right: Normal.      Left: Normal.          Comments: Right breast fibroadenoma without changes  Abdominal:      General: Abdomen is flat.      Palpations: Abdomen is soft.      Hernia: There is no hernia in the left inguinal area or right inguinal area.   Genitourinary:     General: Normal vulva.      Vagina: Normal.      Cervix: Normal.      Uterus: Normal.       Adnexa: Right adnexa normal and left adnexa normal.      Comments: Lacerations healed  Musculoskeletal:         General: Normal range of motion.      Cervical back: Normal range of motion.   Lymphadenopathy:      Cervical: No cervical adenopathy.      Right cervical: No superficial, deep or posterior cervical adenopathy.     Left cervical: No superficial, deep or posterior cervical  adenopathy.      Lower Body: No right inguinal adenopathy. No left inguinal adenopathy.   Skin:     General: Skin is warm.      Capillary Refill: Capillary refill takes less than 2 seconds.   Neurological:      Mental Status: She is alert and oriented to person, place, and time.   Psychiatric:         Mood and Affect: Mood normal.         Behavior: Behavior normal.          Plan      A/P. 29 y.o. now , s/p , normal recovery course  Last pap: 2021 neg HPV neg, pap repeated today. If wnl next pap due in 3 years.  Postpartum contraception discussed - patient declines  Decreased urinary urgency: Patient to start pelvic floor physical therapy next week as referral was already placed.  Returning to exercise and sex discussed. Patient is cleared.  Discussed use of high-quality lubricant during periods of intimacy due to decreased estrogen levels while breast-feeding.  Encouraged patient to continue to monitor for signs or symptoms of  mood and anxiety disorders  Routine follow up with PCP for health maintenance examination encouraged including TSH, cholesterol and vitamin D evaluation.  Encouraged continued breastfeeding. Patient aware resources are available should she need them.   Warning s/s discussed  All questions answered    F/U as needed , and for routine annual exam   SYDNEY Gautam-SYDNEE HERRERA MA

## 2024-10-04 ENCOUNTER — LAB (OUTPATIENT)
Dept: LAB | Facility: LAB | Age: 29
End: 2024-10-04
Payer: COMMERCIAL

## 2024-10-04 DIAGNOSIS — O99.282 THYROID DISEASE DURING PREGNANCY IN SECOND TRIMESTER (HHS-HCC): ICD-10-CM

## 2024-10-04 DIAGNOSIS — E07.9 THYROID DISEASE DURING PREGNANCY IN SECOND TRIMESTER (HHS-HCC): ICD-10-CM

## 2024-10-04 DIAGNOSIS — E03.9 HYPOTHYROIDISM, UNSPECIFIED TYPE: ICD-10-CM

## 2024-10-04 LAB
T4 FREE SERPL-MCNC: 0.98 NG/DL (ref 0.61–1.12)
TSH SERPL-ACNC: 0.76 MIU/L (ref 0.44–3.98)

## 2024-10-04 PROCEDURE — 84439 ASSAY OF FREE THYROXINE: CPT

## 2024-10-04 PROCEDURE — 84443 ASSAY THYROID STIM HORMONE: CPT

## 2024-10-04 PROCEDURE — 36415 COLL VENOUS BLD VENIPUNCTURE: CPT

## 2024-10-07 ENCOUNTER — LACTATION CONSULT (OUTPATIENT)
Dept: LACTATION | Facility: CLINIC | Age: 29
End: 2024-10-07
Payer: COMMERCIAL

## 2024-10-07 DIAGNOSIS — O92.70 LACTATION PROBLEM (HHS-HCC): Primary | ICD-10-CM

## 2024-10-07 PROCEDURE — 99211 OFF/OP EST MAY X REQ PHY/QHP: CPT | Performed by: EMERGENCY MEDICINE

## 2024-10-08 ENCOUNTER — EVALUATION (OUTPATIENT)
Dept: PHYSICAL THERAPY | Facility: CLINIC | Age: 29
End: 2024-10-08
Payer: COMMERCIAL

## 2024-10-08 DIAGNOSIS — N94.9 PAIN OF FEMALE SYMPHYSIS PUBIS: Primary | ICD-10-CM

## 2024-10-08 PROCEDURE — 97161 PT EVAL LOW COMPLEX 20 MIN: CPT | Mod: GP | Performed by: PHYSICAL THERAPIST

## 2024-10-08 PROCEDURE — 97110 THERAPEUTIC EXERCISES: CPT | Mod: GP | Performed by: PHYSICAL THERAPIST

## 2024-10-08 ASSESSMENT — PATIENT HEALTH QUESTIONNAIRE - PHQ9
1. LITTLE INTEREST OR PLEASURE IN DOING THINGS: NOT AT ALL
2. FEELING DOWN, DEPRESSED OR HOPELESS: NOT AT ALL
SUM OF ALL RESPONSES TO PHQ9 QUESTIONS 1 AND 2: 0

## 2024-10-08 ASSESSMENT — ENCOUNTER SYMPTOMS
DEPRESSION: 0
LOSS OF SENSATION IN FEET: 0
OCCASIONAL FEELINGS OF UNSTEADINESS: 0

## 2024-10-08 ASSESSMENT — COLUMBIA-SUICIDE SEVERITY RATING SCALE - C-SSRS
1. IN THE PAST MONTH, HAVE YOU WISHED YOU WERE DEAD OR WISHED YOU COULD GO TO SLEEP AND NOT WAKE UP?: NO
2. HAVE YOU ACTUALLY HAD ANY THOUGHTS OF KILLING YOURSELF?: NO
6. HAVE YOU EVER DONE ANYTHING, STARTED TO DO ANYTHING, OR PREPARED TO DO ANYTHING TO END YOUR LIFE?: NO

## 2024-10-08 NOTE — PROGRESS NOTES
Physical Therapy    Physical Therapy Evaluation and Treatment     Patient Name:  Anne Wu   Patient MRN: [id number]  Date: 10/8/2024    Time Calculation  Start Time: 1150  Stop Time: 1250  Time Calculation (min): 60 min    Referring provider: Madiha Hopkins    Insurance:  Insurance Type:  employee  Visit number: 1    Approved # of visits ?  Authorization Needed:  yes  Cert Date Ends On: ?     Therapy diagnoses:   Problem List Items Addressed This Visit    None  Visit Diagnoses         Codes    Pain of female symphysis pubis    -  Primary N94.9    Relevant Orders    Follow Up In Physical Therapy              Precautions: no fall risk  PMH: pt is 7 weeks post partum at time of evaluation     Assessment: Pt is 29 y.o. female c/o pubic symphysis pain, perineal pain, and decreased urinary urge since birth of first child on 8/22/24. Pt demonstrates decreased single leg stabilization, decreased BLE flexibility, decreased pelvic floor strength and endurance, and tenderness to perineal scar at 5 Oclock position superficial pelvic floor. Pt also has 2 finger width MARCY present at level of umbilicus.  Signs and symptoms consistent with post partum status related to muscle spasm and weakness.  Pt is a good candidate for skilled PT intervention to meet outlined goals.     Plan  Interventions: Biofeedback, Cryotherapy, Dry Needling, Education/Instruction, Electrical Stimulation, Home Program, Hot Pack, Kinesiotaping, Manual Therapy, Neuromuscular Re-education, Self Care/Home Management, self-care, Therapeutic Exercises, Ultrasound  Frequency and Duration: 1x week for 4-6 weeks, then every other week for 4-6 weeks if needed  Rehab Potential: good  Plan of Care Agreement: patient     Goals:  Pt will meet the following goals in 12 weeks  Pt will tolerate vaginal penetration needed for medical exams and intercourse without pain  Pt will achieve neutral sitting posture in sitting and standing by activating TA without cues to   load on joints of spine, pelvis, etc.  Pt will increase bilateral hip strength to 5/5 to help stabilize pelvis during squats, stairs, lifting, pushing, pulling, etc.  Pt will increase pelvic floor strength >/=3/5 for at least 10 seconds followed by complete relaxation demonstrating improved pelvic floor mobility and control  Improve FEMALE NIH-CPSI total score by at least 6 points (MCID) to improve QOL  Pt will carry infant son in carrier for > 1 hour without pubic bone pain  Pt will report pain free bowel movements and improved control when urge is present > 90% of time  Pt will have 0/10 perineal pain with all activities and positions      Subjective:    Chief Complaint: Quick labor, no epidural, 30 minutes of pushing, laying on left side  with right leg held up.  2nd degree tear and small area that wasn't included in the stitches that wasn't, now is very sensitive.      NOW: not 100% sure when she has to urinate, not getting the normal urge.  Pain in the pubic symphysis which is worse with prolonged standing, walking, straining to have BM, wearing baby carrier.  Fearful to return to intercourse.     Had painful intercourse before pregnancy and during.       Currently breastfeeding     Relevant PMH: hypothyroidism, 1 miscarriage    Pain:  Location: central pubic symphysis   Current: 1/10  Type: soreness  At best: 0/10  At worst: 4/10  Makes better: rest   Makes worse: wearing baby, walking, prolonged standing, BM    Occasional pulling with sitting certain positions         Bladder:  Urgency: no  Daytime Frequency: every 2 hours  Nighttime Frequency: 1x  Leakage: only one time, 2 days postpartum, large accident but nothing since  Protection: no  Incomplete emptying/weak stream/spraying/UTI: no/no/no/no  Fluid intake: 96 oz water, 2 cups coffee in AM    Bowel:   Frequency: 1x per day, doesn't feel like she can hold it like she should   Diarrhea: no  Constipation: no  Fiber/stool softeners: no  [place] stool  "scale: 4    Work: outpatient PT [employer]     Exercise: 1-3 days per week weight machines, free weights (prior to pregnancy).  This past week started to get back to gym light treadmill warm up, light weights    Pt goals for PT: increase time wearing baby, return to gym, painless sex     Objective:  Ortho:    Posture: slight forward head, round shoulders    Lumbar AROM loss:  Flexion: mod- hamstring limitation  Extension: nil  RSB: nil  LSB: nil    SLS: > 10 seconds with contralateral pelvis drop R/L    Flexibility:   Major loss B hamstrings  Mos loss B adductors  Nil loss B quads    Strength:  Hip flexion: R 5, L 5  Hip abduction: R 4+, L 4+  Hip Extension: R 4, L 4      Abdominal activation:  Oblique dominant but can isolate TA with cues    Other:  2 finger width MARCY at level of umbilicus, < 2 finger width above and below  Boggy end feel at level of umbilicus    Tender to pubic bones R/L and symphysis, level and symmetrical  Tender to adductors (tad rosemary) both R/L     Pelvic Floor:    External:   Atrophy/erythema/prolapse/hemorrhoids: no/no/no/no  Perineal scar well healed with small \"skin tag\" in 5 oclock position, tender to palpation     Internal:  Strength: vaginal   P: 3  E: 5  R: -  F: -    Palpation: slight tenderness to superficial musculature at 5 oclock- at scar, otherwise no tenderness or tightness to pelvic floor    Improved pelvic floor contraction with cues, prior to cues contraction 2/5    Outcome Measure:  Female NIH-CPSI= 19    Treatment:   1. Initial evaluation including internal vaginal assessment of pelvic floor muscles-verbal consent obtained   2. Pt ed on diagnosis, prognosis, goals of PT, expectations   3. HEP (see below) ed on normal vs abnormal response    Access Code: [email or web address]  URL: [email or web address]  Date: 10/08/2024  Prepared by: Janna Dhaliwal    Exercises  - Supine Bridge  - 2 x daily - 7 x weekly - 2 sets - 10 reps - 5 hold  - Hooklying Transversus Abdominis " Palpation  - 2 x daily - 7 x weekly - 1 sets - 10 reps - 5 hold  - Bent Knee Fallouts  - 2 x daily - 7 x weekly - 2 sets - 10 reps  - Hooklying Small March  - 2 x daily - 7 x weekly - 2 sets - 10 reps  - Clamshell  - 2 x daily - 7 x weekly - 2 sets - 10 reps - 5 hold  - Sidelying Hip Abduction  - 2 x daily - 7 x weekly - 2 sets - 10 reps  - Supine Butterfly Groin Stretch  - 2 x daily - 7 x weekly - 1 sets - 3 reps - 3-5 breaths hold    - happy baby  - child's pose  - diaphragmatic breathing  - lubricants  - MARCY  - breastfeeding impact on healing, sex, etc.

## 2024-10-09 ENCOUNTER — APPOINTMENT (OUTPATIENT)
Dept: ENDOCRINOLOGY | Facility: CLINIC | Age: 29
End: 2024-10-09
Payer: COMMERCIAL

## 2024-10-09 DIAGNOSIS — E03.8 SUBCLINICAL HYPOTHYROIDISM: Primary | ICD-10-CM

## 2024-10-09 PROCEDURE — 99214 OFFICE O/P EST MOD 30 MIN: CPT | Performed by: STUDENT IN AN ORGANIZED HEALTH CARE EDUCATION/TRAINING PROGRAM

## 2024-10-09 NOTE — PROGRESS NOTES
Lactation Counseling Note    Subjective:    Anne Wu is a 29 y.o. patient referred for lactation counseling. She is here today due to Hyperlactation, Leaking milk, and Latch issues. She was referred by her  friend .     OB HISTORY:   Healthcare Provider: PCP mushtaq Cunningham cnm  /Para: : 2  Para: 1  Weeks Gestation: 40.3  Mode of Delivery: Normal vaginal route  Delivery Complications: None  Maternal Complications: None   Information: Baby's Name: Gume  : 24  Place of Birth: [facility]  Healthcare Provider: amelie  Skin to skin contact: First hour  Birth weight: 7lbs 11oz    Objective:    BREASTFEEDING ASSESSMENT:   Physical Exam    Breast Assessment: Medium, Large, Symmetrical, Full, Firm, Warm, and Compressible  Nipple Assessment/Stage: intact, erect, and creased after feeding    Areola: Normal  Feeding Position: baby - led, breast sandwich, c - hold, cradle, skin to skin, both sides, nipple to nose, and mother demonstrates good positioning  Latch: minimal assistance is needed, eagerly grasped on to latch, shallow latch, comfortable with no pain, flanged lips, chin moves in rhythmic motion, and frequent audible swallows  Suck/Feeding: sustained, disorganized suck, choking/gulping, and content after feeding  Alternative Feeding Methods: nursing at the breast  Infant Feeding Method: Breast milk only  Infant Behavior: awake, active alert, readiness to feed, feeding cues observed, and rooting response  Infant Information: Ankyloglossia  Receding chin  Palate- high/arch/bubble/normal  Poor occlusion of lips around areola  Tongue protrudes over alveolar ridge  Nipple Pain: Pain scale 0-10 (10 most pain): 0-1  Weight: Weight before feedinlbs 8.9oz  Weight after feedinlbs 13.5oz  Amount of breast milk transferred: 4.60z ml    PATIENT DISCUSSION SUMMARY: Mom here for the first time referred by a friend complaining of clicking and baby taking in large amounts of air during nursing.   Baby is gaining weight appropriately baby is 3 months old and 5 pounds over birthweight.  Observe the feeding with mom baby with a very shallow latch.  Mom denies any pain.  Nipple does come out flattened.  Showed mom how to suction feel mom denies that feeling.  Baby did transfer 4.6 ounces.  Mom denies any vomiting or choking at the breast.  Upon assessment did note a lip and tongue-tie.  Referred to pediatrician for assessment diagnosis and referral for revision.  Mom to return as needed for weight checks.    LACTATION PROBLEMS AND STRATEGIES:  Hyperlactation: Avoid long periods between feedings  Block feeding -offer same breast for 3 hours  Burp baby frequently and pace feeding  Consult with physician concerning lactation-inhibiting drugs ( Sudafed 60 mg, Benadryl)  Decrease the frequency of feedings  Discuss use of pseudoephedrine with physician  Drink peppermint tea to lower breast milk supply  If breast uncomfortable, express just enough to soften  Lean back or use reclining position during feedings  Massage any plugged milk ducts during feeding  P1-164 Breastfeeding: Plugged Milk Ducts/Mastitis given  Place baby in upright position facing your breast  Refer mother to health provider  Use only one breast at each feeding  Use the same breast for several feedings  Utilize Australian hold as a method to slow the flow of milk at the breast through decreasing the gravity factor  Utilize a nipple shield to funnel the milk at the breast so baby can keep up with the flow  Utilize paced feeding allowing the baby to come off the breast to catch her breath as needed  Problems latching baby to breast: Baby should latch to areola (dark area) not just the nipple.  Baby's lips should be flanged outward.  Bring the baby up to the level of your breast by putting a pillow under the baby.  Do not use bottles or pacifiers until latching on well.  Dribble milk over the nipple or express milk so that baby can taste it  Encourage a  deeper latch with the asymetrical latch- lining baby's nose opposite mother's nipple.  Encourage nipple to stand up prior to feeing by pumping, nipple rolling or by brief application of ice.  Gently tickle your baby's lip with your nipple to encourage your baby to open his/her mouth wide.  Hold baby so that your breast is positioned deep in the baby's mouth.  Hold your baby close, tummy to tummy.  If baby does not latch to breast, express breast milk.  If engorged, express some milk to soften breast.  If the baby is not latched on well, break seal and gently try again.  Keep baby skin to skin and watch for feeding cues.  Massage breast to start milk-ejection reflex.  Place nipple and at least 1 inch of areola into baby's mouth.  Place your hand behind the baby's neck and shoulder.  Do not force baby's head into breast.  Put baby in the football hold or clutch hold, supporting his/her neck and head in sniffing position to open his/her throat.  Shape breast into oval shape (sandwich hold)  for deep latch.  Try different feeding positions (cradle, football, side etc.).  Use a breast feeding pillow to bring baby up to the level of the breast.  Use nipple shield and monitor baby's weight gain and output.  Use semi-reclined feeding position to allow baby to take an active role and trigger baby's inborn feeding behavior.  Use your hand to support your breast during feeding.  When your baby's mouth is wide open, quickly pull baby into your breast.  Your baby's chin should be pressed into your breast.  PATIENT INSTRUCTION HANDOUTS GIVEN:      LACTATION EDUCATION:  Correct Positioning and Correct Latch On

## 2024-10-09 NOTE — PROGRESS NOTES
Subjective   Patient ID: Anne Wu is a 29 y.o. female who presents for Hypothyroidism (VIRTUAL VISIT::: /Delivery Date: 8/22/2024/PCP: Geeta ).  Lab Results   Component Value Date    TSH 0.76 10/04/2024      HPI  The patient is a 29-year-old female who presents today for hypothyroidism presents for follow up.  She is 7 weeks post partum with her son  She is exclusively breastfeeding   She will no start OCP , she is planning on having another baby in a year or two  She is overall  doing well,no new concerns         History :  She states that she had preconception labs which showed a borderline slow TSH of 4.6 and was subsequently started on LT4 she had no symptoms of hypothyroidism at that time.  TPO was negative , TG negative   She also had US which showed mild thyromegaly , no nodules      She is a physical therapist at  [employer]   Her  is a PMR resident   Review of Systems    Objective   Physical Exam NO vitals due to virtual visit     Assessment/Plan        30 yo female with  with history of asymptomatic subclinical hypothyroidism prior to current pregnancy ,she was started on Lt4 shortly before concieving. She is   clinically and biochemically euthyroid on LT4 25 mcg which she takes appropriately. She is now 7 weeks post partum   Stop LT4 for 1 month if TSH shows subclinical hypothyroid will restrat LT4     Hashimoto's antibodies/TPO  negative, Tg ab negative     Mild thyromegaly with no nodules     Labs in 1 month   Further recommendation pending above

## 2024-10-10 NOTE — PATIENT INSTRUCTIONS
PATIENT DISCUSSION SUMMARY: Mom here for the first time referred by a friend complaining of clicking and baby taking in large amounts of air during nursing.  Baby is gaining weight appropriately baby is 3 months old and 5 pounds over birthweight.  Observe the feeding with mom baby with a very shallow latch.  Mom denies any pain.  Nipple does come out flattened.  Showed mom how to suction feel mom denies that feeling.  Baby did transfer 4.6 ounces.  Mom denies any vomiting or choking at the breast.  Upon assessment did note a lip and tongue-tie.  Referred to pediatrician for assessment diagnosis and referral for revision.  Mom to return as needed for weight checks.

## 2024-10-15 ENCOUNTER — TREATMENT (OUTPATIENT)
Dept: PHYSICAL THERAPY | Facility: CLINIC | Age: 29
End: 2024-10-15
Payer: COMMERCIAL

## 2024-10-15 DIAGNOSIS — N94.9 PAIN OF FEMALE SYMPHYSIS PUBIS: ICD-10-CM

## 2024-10-15 LAB
CYTOLOGY CMNT CVX/VAG CYTO-IMP: NORMAL
HPV HR 12 DNA GENITAL QL NAA+PROBE: NEGATIVE
HPV HR GENOTYPES PNL CVX NAA+PROBE: NEGATIVE
HPV16 DNA SPEC QL NAA+PROBE: NEGATIVE
HPV18 DNA SPEC QL NAA+PROBE: NEGATIVE
LAB AP HPV GENOTYPE QUESTION: YES
LAB AP HPV HR: NORMAL
LABORATORY COMMENT REPORT: NORMAL
PATH REPORT.TOTAL CANCER: NORMAL

## 2024-10-15 PROCEDURE — 97110 THERAPEUTIC EXERCISES: CPT | Mod: GP | Performed by: PHYSICAL THERAPIST

## 2024-10-15 NOTE — PROGRESS NOTES
PHYSICAL THERAPY   TREATMENT NOTE    Patient Name:  Anne Wu   Patient MRN: [id number]  Date: 10/15/2024    Time Calculation  Start Time: 1345  Stop Time: 1430  Time Calculation (min): 45 min    Insurance:  Insurance Type:  employee  Visit number: 2    Approved # of visits ?  Authorization Needed: yes  Cert Date Ends On: ?    General   Reason for visit: pubic symphysis, postpartum weakness    Referred by: Madiha Hopkins CNM    Therapy diagnoses:   Problem List Items Addressed This Visit    None  Visit Diagnoses         Codes    Pain of female symphysis pubis     N94.9            Assessment:    Pt has excellent TA isolation and able to progress stabilization exercises.  Some low back discomfort with marching but resolved with bridging and pelvic tilts.      Plan:  Internal check next visit, possibly add pelvic floor mobility    Subjective  Pt states she feels about the same but hasn't had a chance to wear baby to see if that has helped but has been compliant with exercises daily.  Has done massage about 3x since last week.  Pt states she has been trying not to think about urinating as much and maybe it is getting better but doesn't quite feel normal.  Still having pain to entire pelvis when having bowel movement.     Happy baby stretch doesn't feel great to pubic symphysis.  Walking on treadmill for 1-1.5 miles.      - Pain (0-10): 0/10     Precautions: no fall risk  PMH: pt is 7 weeks post partum at time of evaluation    Objective    Treatment Performed:   Therapeutic Exercise:   - HL TA review  - HL TA BKFO 10x R/L  - HL TA marching 10x R/L  - HL DB chest fly 5# 10x  - HL DB OH shoulder flexion 5# 10x  - HL DB pec fly 5# 10x  - HL DB tricep extension 5# 10x  - side clams 10x R/L  - side SLR with TA 10x R/L  - sidelying open book for T/S rotation 10x R/L  - quadruped T/S rotation 10x R/L  - child's pose 2 minutes      Manual Therapy:     Neuromuscular Re-education:     Gait Training:      Modalities:  minutes       PT Therapeutic Procedures Time Entry  Therapeutic Exercise Time Entry: 45

## 2024-10-16 ENCOUNTER — LACTATION CONSULT (OUTPATIENT)
Dept: LACTATION | Facility: CLINIC | Age: 29
End: 2024-10-16
Payer: COMMERCIAL

## 2024-10-16 DIAGNOSIS — O92.70 LACTATION PROBLEM (HHS-HCC): Primary | ICD-10-CM

## 2024-10-16 PROCEDURE — 99211 OFF/OP EST MAY X REQ PHY/QHP: CPT | Performed by: EMERGENCY MEDICINE

## 2024-10-16 NOTE — PATIENT INSTRUCTIONS
Mom and baby (8 weeks) here with grandma for weight check. Mom states baby had frenotomy done yesterday at [facility]. Today, baby latched to the right breast with a very shallow latch and was gulping loudly with letdown. Advised mom to try laid back positioning or side lying to help control the flow or to catch letdown in  then latch baby after. Baby transferred 4.5oz from the right breast. Baby did spit up after feeding and is gassy. Content after feeding on one breast. Assessed frenotomy site, appears to be healing well, stretches done here, upper lip is tight and difficult to flange upward to nostrils. When nursing, the upper lip is sometimes turned in and mom has to flange out. Here, the upper lip was in a more neutral position but difficult to flange outward. Follow up with pediatrician for referral to OT for suck training.Will return here next week for weight check.

## 2024-10-16 NOTE — PROGRESS NOTES
Lactation Counseling Note    Subjective:    Anne Wu is a 29 y.o. patient referred for lactation counseling. She is here today due to Hyperlactation, Latch issues, and weight check . She was referred by her  LC at [facility] .     OB HISTORY:   Baby Name: Gume    Objective:    BREASTFEEDING ASSESSMENT:   Physical Exam    Breast Assessment: Large, Symmetrical, Full, Soft, Warm, Compressible, and Readiness to feed  Nipple Assessment/Stage: intact and erect with stimulation without pain  Areola: Normal  Feeding Position: cradle, cross - cradle, laid back, skin to skin, one side per feeding, and mother needs assistance with latch/positioning  Latch: minimal assistance is needed, instructed on deep latch, shallow latch, mouth not open wide enough, and frequent audible swallows  Suck/Feeding: disorganized suck, choking/gulping, and content after feeding  Infant Feeding Method: Breast milk only  Infant Behavior: awake, fussy, readiness to feed, feeding cues observed, rooting response, gaggy/spitty, and content after feeding  Infant Information: Post status frenotomy  Receding chin  Palate- high/arch/bubble/normal  Breast Pain: Pain scale 0-10 (10 most pain): 0  Nipple Pain: Pain scale 0-10 (10 most pain): 0  Weight: Weight before feedinlbs 4.6oz  Weight after feedinlbs 9.1oz  Amount of breast milk transferred: 4.5oz ml    PATIENT DISCUSSION SUMMARY: Mom and baby (8 weeks) here with grandma for weight check. Mom states baby had frenotomy done yesterday at [facility]. Today, baby latched to the right breast with a very shallow latch and was gulping loudly with letdown. Advised mom to try laid back positioning or side lying to help control the flow or to catch letdown in  then latch baby after. Baby transferred 4.5oz from the right breast. Baby did spit up after feeding and is gassy. Content after feeding on one breast. Assessed frenotomy site, appears to be healing well, stretches done here,  upper lip is tight and difficult to flange upward to nostrils. When nursing, the upper lip is sometimes turned in and mom has to flange out. Here, the upper lip was in a more neutral position but difficult to flange outward. Follow up with pediatrician for referral to OT for suck training.Will return here next week for weight check.     LACTATION PROBLEMS AND STRATEGIES:  Hyperlactation: Avoid long periods between feedings  Block feeding -offer same breast for 3 hours  Burp baby frequently and pace feeding  Consult with physician concerning lactation-inhibiting drugs ( Sudafed 60 mg, Benadryl)  Decrease the frequency of feedings  Discuss use of pseudoephedrine with physician  Drink peppermint tea to lower breast milk supply  If breast uncomfortable, express just enough to soften  Lean back or use reclining position during feedings  Massage any plugged milk ducts during feeding  P1-164 Breastfeeding: Plugged Milk Ducts/Mastitis given  Place baby in upright position facing your breast  Refer mother to health provider  Use only one breast at each feeding  Use the same breast for several feedings  Utilize Australian hold as a method to slow the flow of milk at the breast through decreasing the gravity factor  Utilize a nipple shield to funnel the milk at the breast so baby can keep up with the flow  Utilize paced feeding allowing the baby to come off the breast to catch her breath as needed  Problems latching baby to breast: Baby should latch to areola (dark area) not just the nipple.  Baby's lips should be flanged outward.  Bring the baby up to the level of your breast by putting a pillow under the baby.  Do not use bottles or pacifiers until latching on well.  Dribble milk over the nipple or express milk so that baby can taste it  Encourage a deeper latch with the asymetrical latch- lining baby's nose opposite mother's nipple.  Encourage nipple to stand up prior to feeing by pumping, nipple rolling or by brief  application of ice.  Gently tickle your baby's lip with your nipple to encourage your baby to open his/her mouth wide.  Hold baby so that your breast is positioned deep in the baby's mouth.  Hold your baby close, tummy to tummy.  If baby does not latch to breast, express breast milk.  If engorged, express some milk to soften breast.  If the baby is not latched on well, break seal and gently try again.  Keep baby skin to skin and watch for feeding cues.  Massage breast to start milk-ejection reflex.  Place nipple and at least 1 inch of areola into baby's mouth.  Place your hand behind the baby's neck and shoulder.  Do not force baby's head into breast.  Put baby in the football hold or clutch hold, supporting his/her neck and head in sniffing position to open his/her throat.  Shape breast into oval shape (sandwich hold)  for deep latch.  Try different feeding positions (cradle, football, side etc.).  Use a breast feeding pillow to bring baby up to the level of the breast.  Use nipple shield and monitor baby's weight gain and output.  Use semi-reclined feeding position to allow baby to take an active role and trigger baby's inborn feeding behavior.  Use your hand to support your breast during feeding.  When your baby's mouth is wide open, quickly pull baby into your breast.  Your baby's chin should be pressed into your breast.  Spitting up: Count wet diapers and stools to ensure baby is getting enough to eat, Eliminate supplements, Gentle handling of baby. Keep baby upright after feedings, If projectile vomiting between 2 and 8 weeks of age, call your physician, If the baby gulps and chokes after letdown, take the baby off of the breast until initial flow subsides, Limit to 1 breast per feeding if spitting up is due to too much milk at each feeding, and Temporarily stop vitamin, iron, or fluoride supplements  Weak suck: Avoid pacifiers and bottles.  Contact the baby's doctor to do a thorough exam to rule out physical  problems.  [place] with different positions to see if suck improves.  Express milk after, or between feedings, to build milk supply.  Provide supplement if not gaining weight, preferably a nursing supplementer.  Review the latch-on and positioning information. Make sure the baby is pulled in close.  Stimulate the baby's lips by circling them with your finger several times.  Support the baby's jaw and chin using Dancer Hand Position.  Switch sides of nursing when baby dozes off. Repeat several times through feeding.  PATIENT INSTRUCTION HANDOUTS GIVEN:   .  LACTATION EDUCATION:  Correct Positioning and Correct Latch On

## 2024-10-22 ENCOUNTER — APPOINTMENT (OUTPATIENT)
Dept: PHYSICAL THERAPY | Facility: CLINIC | Age: 29
End: 2024-10-22
Payer: COMMERCIAL

## 2024-10-31 ENCOUNTER — TREATMENT (OUTPATIENT)
Dept: PHYSICAL THERAPY | Facility: CLINIC | Age: 29
End: 2024-10-31
Payer: COMMERCIAL

## 2024-10-31 DIAGNOSIS — N94.9 PAIN OF FEMALE SYMPHYSIS PUBIS: ICD-10-CM

## 2024-10-31 PROCEDURE — 97535 SELF CARE MNGMENT TRAINING: CPT | Mod: GP | Performed by: PHYSICAL THERAPIST

## 2024-10-31 PROCEDURE — 97140 MANUAL THERAPY 1/> REGIONS: CPT | Mod: GP | Performed by: PHYSICAL THERAPIST

## 2024-11-05 ENCOUNTER — APPOINTMENT (OUTPATIENT)
Dept: PHYSICAL THERAPY | Facility: CLINIC | Age: 29
End: 2024-11-05
Payer: COMMERCIAL

## 2024-11-11 ENCOUNTER — TELEPHONE (OUTPATIENT)
Dept: ENDOCRINOLOGY | Facility: CLINIC | Age: 29
End: 2024-11-11
Payer: COMMERCIAL

## 2024-11-11 DIAGNOSIS — E03.8 SUBCLINICAL HYPOTHYROIDISM: Primary | ICD-10-CM

## 2024-11-12 ENCOUNTER — APPOINTMENT (OUTPATIENT)
Dept: PHYSICAL THERAPY | Facility: CLINIC | Age: 29
End: 2024-11-12
Payer: COMMERCIAL

## 2024-11-19 ENCOUNTER — APPOINTMENT (OUTPATIENT)
Dept: PHYSICAL THERAPY | Facility: CLINIC | Age: 29
End: 2024-11-19
Payer: COMMERCIAL

## 2024-11-27 ENCOUNTER — LAB (OUTPATIENT)
Dept: LAB | Facility: LAB | Age: 29
End: 2024-11-27
Payer: COMMERCIAL

## 2024-11-27 DIAGNOSIS — E03.8 SUBCLINICAL HYPOTHYROIDISM: ICD-10-CM

## 2024-11-27 LAB
T4 FREE SERPL-MCNC: 1.94 NG/DL (ref 0.61–1.12)
TSH SERPL-ACNC: 0.01 MIU/L (ref 0.44–3.98)

## 2024-11-27 PROCEDURE — 36415 COLL VENOUS BLD VENIPUNCTURE: CPT

## 2024-11-27 PROCEDURE — 84443 ASSAY THYROID STIM HORMONE: CPT

## 2024-11-27 PROCEDURE — 84439 ASSAY OF FREE THYROXINE: CPT

## 2024-12-04 ENCOUNTER — TELEMEDICINE (OUTPATIENT)
Dept: ENDOCRINOLOGY | Facility: CLINIC | Age: 29
End: 2024-12-04
Payer: COMMERCIAL

## 2024-12-04 DIAGNOSIS — E05.90 HYPERTHYROIDISM: Primary | ICD-10-CM

## 2024-12-04 PROCEDURE — 99214 OFFICE O/P EST MOD 30 MIN: CPT | Performed by: STUDENT IN AN ORGANIZED HEALTH CARE EDUCATION/TRAINING PROGRAM

## 2024-12-04 NOTE — PROGRESS NOTES
Subjective   Patient ID: Anne Wu is a 29 y.o. female who presents for Hypothyroidism (VIRTUAL VISIT:: discuss labs /PCP: Hadney/NO thyroid medication since 10/9/2024 per patient ).  Lab Results   Component Value Date    TSH 0.01 (L) 11/27/2024      HPI  The patient is a 29-year-old female who presents today for hypothyroidism presents for follow up.  She is  ~ 15 weeks post partum with her son  She denies overt symptoms of hyperthyroidism but states that she was able to get back to her prepregnancy weight , hair thinning also noted.  She is exclusively breastfeeding   She will not start OCP , she is planning on having another baby in a year or two        History :  She states that she had preconception labs which showed a borderline slow TSH of 4.6 and was subsequently started on LT4 she had no symptoms of hypothyroidism at that time.  TPO was negative , TG negative   She also had US which showed mild thyromegaly , no nodules      She is a physical therapist at  [employer]   Her  is a PMR resident   Review of Systems    Objective   Physical Exam NO vitals due to virtual visit     Assessment/Plan         28 yo female with  history of asymptomatic subclinical hypothyroidism prior to current pregnancy ,she was started on Lt4 shortly before concieving. She was managed with levothyroxine 25 mcg during pregnancy and the immediate postpartum period.   She is know with biochemical hyperthyroidism , and has been off Lt4 for ~ 8 weeks this may be due to postpartum thyroiditis  versus Graves' disease.  Trend TFT CRP ESR and TSI in 2 weeks to further evaluate this     Hashimoto's antibodies/TPO  negative, Tg ab negative      Mild thyromegaly with no nodules     Labs in 2 weeks   Further recommendation pending above

## 2024-12-16 ENCOUNTER — LAB (OUTPATIENT)
Dept: LAB | Facility: LAB | Age: 29
End: 2024-12-16
Payer: COMMERCIAL

## 2024-12-16 DIAGNOSIS — E05.90 HYPERTHYROIDISM: ICD-10-CM

## 2024-12-16 LAB
CRP SERPL-MCNC: <0.1 MG/DL
ERYTHROCYTE [SEDIMENTATION RATE] IN BLOOD BY WESTERGREN METHOD: 3 MM/H (ref 0–20)
T4 FREE SERPL-MCNC: 2.25 NG/DL (ref 0.61–1.12)
TSH SERPL-ACNC: 0.01 MIU/L (ref 0.44–3.98)

## 2024-12-16 PROCEDURE — 84445 ASSAY OF TSI GLOBULIN: CPT

## 2024-12-16 PROCEDURE — 36415 COLL VENOUS BLD VENIPUNCTURE: CPT

## 2024-12-16 PROCEDURE — 86140 C-REACTIVE PROTEIN: CPT

## 2024-12-16 PROCEDURE — 85652 RBC SED RATE AUTOMATED: CPT

## 2024-12-16 PROCEDURE — 84439 ASSAY OF FREE THYROXINE: CPT

## 2024-12-16 PROCEDURE — 84443 ASSAY THYROID STIM HORMONE: CPT

## 2024-12-19 LAB — THYROID STIMULATING IMMUNOGLOBULIN: <89 % BASELINE

## 2024-12-20 DIAGNOSIS — O99.282 THYROID DISEASE DURING PREGNANCY IN SECOND TRIMESTER (HHS-HCC): ICD-10-CM

## 2024-12-20 DIAGNOSIS — E07.9 THYROID DISEASE DURING PREGNANCY IN SECOND TRIMESTER (HHS-HCC): ICD-10-CM

## 2024-12-20 DIAGNOSIS — E03.8 SUBCLINICAL HYPOTHYROIDISM: ICD-10-CM

## 2024-12-31 ENCOUNTER — LAB (OUTPATIENT)
Dept: LAB | Facility: LAB | Age: 29
End: 2024-12-31
Payer: COMMERCIAL

## 2024-12-31 DIAGNOSIS — E07.9 THYROID DISEASE DURING PREGNANCY IN SECOND TRIMESTER (HHS-HCC): ICD-10-CM

## 2024-12-31 DIAGNOSIS — E05.90 HYPERTHYROIDISM: Primary | ICD-10-CM

## 2024-12-31 DIAGNOSIS — O99.282 THYROID DISEASE DURING PREGNANCY IN SECOND TRIMESTER (HHS-HCC): ICD-10-CM

## 2024-12-31 DIAGNOSIS — E03.8 SUBCLINICAL HYPOTHYROIDISM: ICD-10-CM

## 2024-12-31 LAB
T4 FREE SERPL-MCNC: 1.27 NG/DL (ref 0.61–1.12)
TSH SERPL-ACNC: <0.01 MIU/L (ref 0.44–3.98)

## 2024-12-31 PROCEDURE — 84439 ASSAY OF FREE THYROXINE: CPT

## 2024-12-31 PROCEDURE — 84443 ASSAY THYROID STIM HORMONE: CPT

## 2025-01-08 ENCOUNTER — DOCUMENTATION (OUTPATIENT)
Dept: PHYSICAL THERAPY | Facility: CLINIC | Age: 30
End: 2025-01-08
Payer: COMMERCIAL

## 2025-01-08 NOTE — PROGRESS NOTES
Physical Therapy    Discharge Summary    Name: Anne Wu  MRN: [id number]  : 1995  Date: 25    Discharge Summary: PT    Discharge Information: Date of discharge 25, Date of last visit 10/31/24, Date of evaluation 10/8/24, Number of attended visits 2, Referred by Madiha Hopkins, and Referred for pubic symphysis pain    Therapy Summary: pt was progressing well at last follow up visit         Rehab Discharge Reason: Achieved all and/or the most significant goals(s)

## 2025-01-13 ENCOUNTER — LAB (OUTPATIENT)
Dept: LAB | Facility: LAB | Age: 30
End: 2025-01-13
Payer: COMMERCIAL

## 2025-01-13 DIAGNOSIS — E05.90 HYPERTHYROIDISM: ICD-10-CM

## 2025-01-13 LAB
T4 FREE SERPL-MCNC: 0.76 NG/DL (ref 0.61–1.12)
TSH SERPL-ACNC: 0.03 MIU/L (ref 0.44–3.98)

## 2025-01-13 PROCEDURE — 84443 ASSAY THYROID STIM HORMONE: CPT

## 2025-01-13 PROCEDURE — 84439 ASSAY OF FREE THYROXINE: CPT

## 2025-01-20 ENCOUNTER — APPOINTMENT (OUTPATIENT)
Dept: OBSTETRICS AND GYNECOLOGY | Facility: CLINIC | Age: 30
End: 2025-01-20
Payer: COMMERCIAL

## 2025-01-20 ENCOUNTER — TELEPHONE (OUTPATIENT)
Dept: OBSTETRICS AND GYNECOLOGY | Facility: CLINIC | Age: 30
End: 2025-01-20

## 2025-02-12 NOTE — PROGRESS NOTES
"Endocrinology  2/13/2025    History of Present Illness   Anne Wu is a 30 y.o. year old female with medical history of subclinical hypothyroidism now with post-partum thyroiditis, here for thyroid.     LV: 12/2024 - Dr. Neri     Current regimen: None   Prior regimen: Levothyroxine 25 mcg daily - prior to and during pregnancy. This was stopped 6 weeks post-partum.     Delivered Aug 2024.     After delivery, her TSH became suppressed.     Weight changes: Lost to below pre-pregnancy weight, now gaining again    Heat or cold intolerance: Hot flashes have improved    Palpitations: Denies    Tremor: Denies    Bowel changes: Denies    Difficulty sleeping: Denies    Menstrual cycles: None since delivery     Neck pain: Denies    Difficulty breathing: Denies    Difficulty swallowing: Denies    Change in voice: Denies      Family history of thyroid disease: Mom and maternal grandmother - hypo and hyper    History of radiation to the head/neck/chest: Denies    Amiodarone use: No    Biotin use: No      She would like to get pregnant again right away   Not currently using any contraception     Medications   No current outpatient medications       History      Past Medical History:   Diagnosis Date    Disease of thyroid gland 9/19/23    Hypothyroidism 9/19/23       No past surgical history on file.    Family History   Problem Relation Name Age of Onset    Atrial fibrillation Mother Elenita Villa     Blood clot Mother Elenita Villa         No Known Allergies     Social history:  - Denies alcohol use   - Denies tobacco use   - Denies recreational drug use   - Works as PT in [employer]   - Lives with  and son (6 months)     Physical Exam   /80   Pulse 101   Ht 1.626 m (5' 4\")   Wt 59 kg (130 lb)   BMI 22.31 kg/m²   \General: Well appearing, no acute distress  Heart: Normal rate  Neck: Soft, nontender, no lymphadenopathy, thyroid normal in size   Lungs: Breathing comfortably on room air   Extremities: Warm, no " edema  Skin: No rashes      Labs and Imaging     Lab Results   Component Value Date    TSH 0.03 (L) 01/13/2025    C6OCKVA 8.9 04/11/2024    FREET4 0.76 01/13/2025    THYROIDPAB 35 09/11/2023    TSI <89 12/16/2024     STUDY:  US THYROID;  9/14/2023 6:04 pm     INDICATION:  abnormal TSH.     COMPARISON:  None.     ACCESSION NUMBER(S):  [id number]     ORDERING CLINICIAN:  ANNE CÁRDENAS     TECHNIQUE:  Multiple ultrasonographic images of the thyroid gland and surrounding  tissues were obtained.     FINDINGS:  PARENCHYMA:  Homogeneous.     SIZE:  RIGHT LOBE:  5.2 x 1.7 x 1.6 cm.  LEFT LOBE:  4.6 x 1.1 x 1.7 cm.  ISTHMUS:  0.5 cm.     NODULES: (Please note, assessment and description of nodules is per  TI-RADS criteria. Up to 4 total nodules described, which includes  largest and/or most clinically significant based on morphology.) It  is noted that some spongiform and/or cystic nodules may not be  specifically described and are TR category 1 (benign).     No discrete nodules detected.     CERVICAL LYMPH NODES:  No enlarged or morphologically abnormal cervical nodes are seen.      Impression   1. Thyroid goiter without discrete nodule detected.       Assessment and Plan   Anne Wu is a 30 y.o. year old female with medical history of subclinical hypothyroidism now with post-partum thyroiditis, here for thyroid follow up. Was initially treated with low dose levothyroxine prior and during pregnancy. Post-partum, developed thyrotoxicosis. TSH from 1 month prior to this appointment has started to increase.     We discussed the pathophysiology of post-partum thyroiditis, including a likely hypothyroid phase. Given her significant family history and prior use of levothyroxine, I think she has a higher chance of persistent hypothyroidism. All Abs (TSI, TPO, Tg, negative)     # Post-partum thyroiditis:   - TSH, FT4, T3 now   - Follow up labs pending results     RTC: 2 months     Sara Owens DO   Endocrinology

## 2025-02-13 ENCOUNTER — APPOINTMENT (OUTPATIENT)
Dept: ENDOCRINOLOGY | Facility: CLINIC | Age: 30
End: 2025-02-13
Payer: COMMERCIAL

## 2025-02-13 VITALS
HEIGHT: 64 IN | DIASTOLIC BLOOD PRESSURE: 80 MMHG | SYSTOLIC BLOOD PRESSURE: 119 MMHG | WEIGHT: 130 LBS | BODY MASS INDEX: 22.2 KG/M2 | HEART RATE: 101 BPM

## 2025-02-13 PROCEDURE — G2211 COMPLEX E/M VISIT ADD ON: HCPCS | Performed by: STUDENT IN AN ORGANIZED HEALTH CARE EDUCATION/TRAINING PROGRAM

## 2025-02-13 PROCEDURE — 3008F BODY MASS INDEX DOCD: CPT | Performed by: STUDENT IN AN ORGANIZED HEALTH CARE EDUCATION/TRAINING PROGRAM

## 2025-02-13 PROCEDURE — 99204 OFFICE O/P NEW MOD 45 MIN: CPT | Performed by: STUDENT IN AN ORGANIZED HEALTH CARE EDUCATION/TRAINING PROGRAM

## 2025-02-13 ASSESSMENT — ENCOUNTER SYMPTOMS
OCCASIONAL FEELINGS OF UNSTEADINESS: 0
LOSS OF SENSATION IN FEET: 0
DEPRESSION: 0

## 2025-02-13 ASSESSMENT — PATIENT HEALTH QUESTIONNAIRE - PHQ9
2. FEELING DOWN, DEPRESSED OR HOPELESS: NOT AT ALL
1. LITTLE INTEREST OR PLEASURE IN DOING THINGS: NOT AT ALL
SUM OF ALL RESPONSES TO PHQ9 QUESTIONS 1 AND 2: 0

## 2025-02-13 NOTE — PATIENT INSTRUCTIONS
- Please get labs done   - I will let you know the results and next steps   - Follow up 2 month follow up

## 2025-02-14 LAB
T3 SERPL-MCNC: 73 NG/DL (ref 76–181)
T4 FREE SERPL-MCNC: 1 NG/DL (ref 0.8–1.8)
TSH SERPL-ACNC: 12.29 MIU/L

## 2025-03-18 ENCOUNTER — TELEPHONE (OUTPATIENT)
Dept: ENDOCRINOLOGY | Facility: CLINIC | Age: 30
End: 2025-03-18
Payer: COMMERCIAL

## 2025-03-18 LAB
T4 FREE SERPL-MCNC: 1 NG/DL (ref 0.8–1.8)
TSH SERPL-ACNC: 5.39 MIU/L

## 2025-03-18 NOTE — TELEPHONE ENCOUNTER
----- Message from Sara Owens sent at 3/18/2025  8:07 AM EDT -----  Fabián Bishop, can you please call patient and offer her a virtual visit to discuss labs. Thursday I have an opening at 1015 Thank you so much

## 2025-03-20 ENCOUNTER — TELEMEDICINE (OUTPATIENT)
Dept: ENDOCRINOLOGY | Facility: CLINIC | Age: 30
End: 2025-03-20
Payer: COMMERCIAL

## 2025-03-20 PROCEDURE — G2211 COMPLEX E/M VISIT ADD ON: HCPCS | Performed by: STUDENT IN AN ORGANIZED HEALTH CARE EDUCATION/TRAINING PROGRAM

## 2025-03-20 PROCEDURE — 99214 OFFICE O/P EST MOD 30 MIN: CPT | Performed by: STUDENT IN AN ORGANIZED HEALTH CARE EDUCATION/TRAINING PROGRAM

## 2025-03-20 NOTE — PROGRESS NOTES
Endocrinology  3/20/2025    An interactive audio and video telecommunication system which permits real time communications between the patient (at the originating site) and provider (at the distant site) was utilized to provide this telehealth service.    Verbal consent was requested and obtained from Anne Wu on 03/20/25 10:21 AM for a telehealth visit.       History of Present Illness   Anne Wu is a 30 y.o. year old female with medical history of subclinical hypothyroidism now with post-partum thyroiditis, here for thyroid follow up.     LV: 2/13/2025  Feeling well.   She got her period back.     TSH went from 12 --> 5     Current regimen: None   Prior regimen: Levothyroxine 25 mcg daily - prior to and during pregnancy. This was stopped 6 weeks post-partum. Had one miscarriage prior to her last pregnancy and was on levothyroxine at that time. TSH was normal.   LT4 was started by PCP for TSH of 4.68.     Delivered Aug 2024.     After delivery, her TSH became suppressed.     Weight changes: Lost to below pre-pregnancy weight, now gaining again    Heat or cold intolerance: Hot flashes have improved    Palpitations: Denies    Tremor: Denies    Bowel changes: Denies    Difficulty sleeping: Denies    Menstrual cycles: 1 since delivery Aug 2024     Neck pain: Denies    Difficulty breathing: Denies    Difficulty swallowing: Denies    Change in voice: Denies      Family history of thyroid disease: Mom and maternal grandmother - hypo and hyper    History of radiation to the head/neck/chest: Denies    Amiodarone use: No    Biotin use: No      She would like to get pregnant again right away   Not currently using any contraception     Medications   No current outpatient medications       History      Past Medical History:   Diagnosis Date    Disease of thyroid gland 9/19/23    Hypothyroidism 9/19/23       No past surgical history on file.    Family History   Problem Relation Name Age of Onset    Atrial fibrillation  Mother Elenita Villa     Blood clot Mother Elenita Villa         No Known Allergies     Social history:  - Denies alcohol use   - Denies tobacco use   - Denies recreational drug use   - Works as PT in [place]   - Lives with  and son (6 months)     Physical Exam   There were no vitals taken for this visit.  General: Well appearing, no acute distress    Labs and Imaging     Lab Results   Component Value Date    TSH 5.39 (H) 03/17/2025    O4EYQTY 8.9 04/11/2024    FREET4 1.0 03/17/2025    Q6RSEAN 73 (L) 02/13/2025    THYROIDPAB 35 09/11/2023    TSI <89 12/16/2024     STUDY:  US THYROID;  9/14/2023 6:04 pm     INDICATION:  abnormal TSH.     COMPARISON:  None.     ACCESSION NUMBER(S):  [id number]     ORDERING CLINICIAN:  ANNE CÁRDENAS     TECHNIQUE:  Multiple ultrasonographic images of the thyroid gland and surrounding  tissues were obtained.     FINDINGS:  PARENCHYMA:  Homogeneous.     SIZE:  RIGHT LOBE:  5.2 x 1.7 x 1.6 cm.  LEFT LOBE:  4.6 x 1.1 x 1.7 cm.  ISTHMUS:  0.5 cm.     NODULES: (Please note, assessment and description of nodules is per  TI-RADS criteria. Up to 4 total nodules described, which includes  largest and/or most clinically significant based on morphology.) It  is noted that some spongiform and/or cystic nodules may not be  specifically described and are TR category 1 (benign).     No discrete nodules detected.     CERVICAL LYMPH NODES:  No enlarged or morphologically abnormal cervical nodes are seen.      Impression   1. Thyroid goiter without discrete nodule detected.       Assessment and Plan   Anne Wu is a 30 y.o. year old female with medical history of subclinical hypothyroidism now with post-partum thyroiditis, here for thyroid follow up. Was initially treated with low dose levothyroxine prior and during pregnancy. Post-partum, developed thyrotoxicosis. Has since developed hypothyroidism, which is improving without treatment. Discussed the possibility of starting levothyroxine vs.  continuing to monitor. Given the decline in TSH towards normal, negative TPO-Ab, and normal appearing thyroid on ultrasound, I think monitoring is reasonable. If TSH remains elevated on neck lab check can consider restarting low dose levothyroxine.     # Post-partum thyroiditis:   - TSH 6 weeks (5/1/2025)     RTC: Pending results. If TSH is normal, can follow up with PCP. If TSH remains elevated and starting LT4 will arrange for follow up     Sara Owens DO   Endocrinology

## 2025-05-01 ENCOUNTER — LAB (OUTPATIENT)
Dept: LAB | Facility: HOSPITAL | Age: 30
End: 2025-05-01
Payer: COMMERCIAL

## 2025-05-02 LAB — TSH SERPL-ACNC: 4.08 MIU/L

## 2025-05-07 LAB — TSH SERPL-ACNC: NORMAL MIU/L

## 2025-06-17 ENCOUNTER — APPOINTMENT (OUTPATIENT)
Dept: ENDOCRINOLOGY | Facility: CLINIC | Age: 30
End: 2025-06-17
Payer: COMMERCIAL

## 2026-03-19 ENCOUNTER — APPOINTMENT (OUTPATIENT)
Dept: ENDOCRINOLOGY | Facility: CLINIC | Age: 31
End: 2026-03-19
Payer: COMMERCIAL